# Patient Record
Sex: FEMALE | Race: WHITE | URBAN - METROPOLITAN AREA
[De-identification: names, ages, dates, MRNs, and addresses within clinical notes are randomized per-mention and may not be internally consistent; named-entity substitution may affect disease eponyms.]

---

## 2017-01-01 ENCOUNTER — TRANSFERRED RECORDS (OUTPATIENT)
Dept: HEALTH INFORMATION MANAGEMENT | Facility: CLINIC | Age: 67
End: 2017-01-01

## 2018-01-01 ENCOUNTER — TRANSFERRED RECORDS (OUTPATIENT)
Dept: HEALTH INFORMATION MANAGEMENT | Facility: CLINIC | Age: 68
End: 2018-01-01

## 2018-01-01 ENCOUNTER — APPOINTMENT (OUTPATIENT)
Dept: CT IMAGING | Facility: CLINIC | Age: 68
DRG: 193 | End: 2018-01-01
Attending: HOSPITALIST
Payer: MEDICARE

## 2018-01-01 ENCOUNTER — HOSPITAL ENCOUNTER (EMERGENCY)
Facility: CLINIC | Age: 68
Discharge: ANOTHER HOSPITAL | End: 2018-04-01
Attending: EMERGENCY MEDICINE | Admitting: EMERGENCY MEDICINE
Payer: MEDICARE

## 2018-01-01 ENCOUNTER — HOSPITAL ENCOUNTER (INPATIENT)
Facility: CLINIC | Age: 68
LOS: 4 days | Discharge: ANOTHER HOSPITAL | DRG: 193 | End: 2018-03-06
Attending: EMERGENCY MEDICINE | Admitting: INTERNAL MEDICINE
Payer: MEDICARE

## 2018-01-01 ENCOUNTER — APPOINTMENT (OUTPATIENT)
Dept: CT IMAGING | Facility: CLINIC | Age: 68
DRG: 193 | End: 2018-01-01
Attending: EMERGENCY MEDICINE
Payer: MEDICARE

## 2018-01-01 ENCOUNTER — APPOINTMENT (OUTPATIENT)
Dept: GENERAL RADIOLOGY | Facility: CLINIC | Age: 68
End: 2018-01-01
Attending: EMERGENCY MEDICINE
Payer: MEDICARE

## 2018-01-01 VITALS
OXYGEN SATURATION: 98 % | BODY MASS INDEX: 20.98 KG/M2 | DIASTOLIC BLOOD PRESSURE: 82 MMHG | RESPIRATION RATE: 19 BRPM | SYSTOLIC BLOOD PRESSURE: 132 MMHG | TEMPERATURE: 97.3 F | WEIGHT: 130 LBS

## 2018-01-01 VITALS
DIASTOLIC BLOOD PRESSURE: 60 MMHG | HEIGHT: 66 IN | RESPIRATION RATE: 20 BRPM | TEMPERATURE: 97.4 F | BODY MASS INDEX: 23.17 KG/M2 | SYSTOLIC BLOOD PRESSURE: 117 MMHG | OXYGEN SATURATION: 94 % | HEART RATE: 100 BPM | WEIGHT: 144.18 LBS

## 2018-01-01 DIAGNOSIS — J96.01 ACUTE RESPIRATORY FAILURE WITH HYPOXIA AND HYPERCARBIA (H): ICD-10-CM

## 2018-01-01 DIAGNOSIS — J96.02 ACUTE RESPIRATORY FAILURE WITH HYPOXIA AND HYPERCARBIA (H): ICD-10-CM

## 2018-01-01 DIAGNOSIS — C34.91 MALIGNANT NEOPLASM OF RIGHT LUNG, UNSPECIFIED PART OF LUNG (H): ICD-10-CM

## 2018-01-01 DIAGNOSIS — J18.9 PNEUMONIA OF BOTH LOWER LOBES DUE TO INFECTIOUS ORGANISM: Primary | ICD-10-CM

## 2018-01-01 LAB
ALBUMIN UR-MCNC: 30 MG/DL
ANION GAP SERPL CALCULATED.3IONS-SCNC: 4 MMOL/L (ref 3–14)
ANION GAP SERPL CALCULATED.3IONS-SCNC: 6 MMOL/L (ref 3–14)
ANION GAP SERPL CALCULATED.3IONS-SCNC: 6 MMOL/L (ref 3–14)
ANION GAP SERPL CALCULATED.3IONS-SCNC: 7 MMOL/L (ref 3–14)
ANION GAP SERPL CALCULATED.3IONS-SCNC: 7 MMOL/L (ref 3–14)
ANION GAP SERPL CALCULATED.3IONS-SCNC: 8 MMOL/L (ref 3–14)
APPEARANCE UR: ABNORMAL
BACTERIA SPEC CULT: NO GROWTH
BACTERIA SPEC CULT: NO GROWTH
BACTERIA SPEC CULT: NORMAL
BASOPHILS # BLD AUTO: 0 10E9/L (ref 0–0.2)
BASOPHILS # BLD AUTO: 0.1 10E9/L (ref 0–0.2)
BASOPHILS NFR BLD AUTO: 0.2 %
BASOPHILS NFR BLD AUTO: 0.3 %
BILIRUB UR QL STRIP: NEGATIVE
BUN SERPL-MCNC: 10 MG/DL (ref 7–30)
BUN SERPL-MCNC: 15 MG/DL (ref 7–30)
BUN SERPL-MCNC: 18 MG/DL (ref 7–30)
BUN SERPL-MCNC: 22 MG/DL (ref 7–30)
BUN SERPL-MCNC: 25 MG/DL (ref 7–30)
BUN SERPL-MCNC: 32 MG/DL (ref 7–30)
C DIFF TOX B STL QL: NEGATIVE
CALCIUM SERPL-MCNC: 8.2 MG/DL (ref 8.5–10.1)
CALCIUM SERPL-MCNC: 8.5 MG/DL (ref 8.5–10.1)
CALCIUM SERPL-MCNC: 8.6 MG/DL (ref 8.5–10.1)
CALCIUM SERPL-MCNC: 8.7 MG/DL (ref 8.5–10.1)
CALCIUM SERPL-MCNC: 8.8 MG/DL (ref 8.5–10.1)
CALCIUM SERPL-MCNC: 8.8 MG/DL (ref 8.5–10.1)
CHLORIDE SERPL-SCNC: 100 MMOL/L (ref 94–109)
CHLORIDE SERPL-SCNC: 100 MMOL/L (ref 94–109)
CHLORIDE SERPL-SCNC: 86 MMOL/L (ref 94–109)
CHLORIDE SERPL-SCNC: 88 MMOL/L (ref 94–109)
CHLORIDE SERPL-SCNC: 91 MMOL/L (ref 94–109)
CHLORIDE SERPL-SCNC: 98 MMOL/L (ref 94–109)
CO2 BLDCOV-SCNC: 37 MMOL/L (ref 21–28)
CO2 BLDCOV-SCNC: 39 MMOL/L (ref 21–28)
CO2 SERPL-SCNC: 32 MMOL/L (ref 20–32)
CO2 SERPL-SCNC: 32 MMOL/L (ref 20–32)
CO2 SERPL-SCNC: 33 MMOL/L (ref 20–32)
CO2 SERPL-SCNC: 33 MMOL/L (ref 20–32)
CO2 SERPL-SCNC: 34 MMOL/L (ref 20–32)
CO2 SERPL-SCNC: 35 MMOL/L (ref 20–32)
COLOR UR AUTO: ABNORMAL
CREAT SERPL-MCNC: 0.51 MG/DL (ref 0.52–1.04)
CREAT SERPL-MCNC: 0.51 MG/DL (ref 0.52–1.04)
CREAT SERPL-MCNC: 0.58 MG/DL (ref 0.57–1.11)
CREAT SERPL-MCNC: 0.62 MG/DL (ref 0.52–1.04)
CREAT SERPL-MCNC: 1.04 MG/DL (ref 0.52–1.04)
CREAT SERPL-MCNC: 1.2 MG/DL (ref 0.52–1.04)
CREAT SERPL-MCNC: 1.28 MG/DL (ref 0.52–1.04)
D DIMER PPP FEU-MCNC: 3.2 UG/ML FEU (ref 0–0.5)
DIFFERENTIAL METHOD BLD: ABNORMAL
DIFFERENTIAL METHOD BLD: ABNORMAL
EOSINOPHIL # BLD AUTO: 0 10E9/L (ref 0–0.7)
EOSINOPHIL # BLD AUTO: 0.2 10E9/L (ref 0–0.7)
EOSINOPHIL NFR BLD AUTO: 0.1 %
EOSINOPHIL NFR BLD AUTO: 1.2 %
ERYTHROCYTE [DISTWIDTH] IN BLOOD BY AUTOMATED COUNT: 17 % (ref 10–15)
ERYTHROCYTE [DISTWIDTH] IN BLOOD BY AUTOMATED COUNT: 17.1 % (ref 10–15)
ERYTHROCYTE [DISTWIDTH] IN BLOOD BY AUTOMATED COUNT: 17.5 % (ref 10–15)
ERYTHROCYTE [DISTWIDTH] IN BLOOD BY AUTOMATED COUNT: 17.9 % (ref 10–15)
ERYTHROCYTE [DISTWIDTH] IN BLOOD BY AUTOMATED COUNT: 18.5 % (ref 10–15)
ERYTHROCYTE [DISTWIDTH] IN BLOOD BY AUTOMATED COUNT: 18.6 % (ref 10–15)
FLUAV+FLUBV AG SPEC QL: NEGATIVE
FLUAV+FLUBV AG SPEC QL: NEGATIVE
GFR SERPL CREATININE-BSD FRML MDRD: 42 ML/MIN/1.7M2
GFR SERPL CREATININE-BSD FRML MDRD: 45 ML/MIN/1.7M2
GFR SERPL CREATININE-BSD FRML MDRD: 53 ML/MIN/1.7M2
GFR SERPL CREATININE-BSD FRML MDRD: >60 ML/MIN/1.73M2
GFR SERPL CREATININE-BSD FRML MDRD: >90 ML/MIN/1.7M2
GLUCOSE BLDC GLUCOMTR-MCNC: 101 MG/DL (ref 70–99)
GLUCOSE BLDC GLUCOMTR-MCNC: 103 MG/DL (ref 70–99)
GLUCOSE BLDC GLUCOMTR-MCNC: 117 MG/DL (ref 70–99)
GLUCOSE BLDC GLUCOMTR-MCNC: 122 MG/DL (ref 70–99)
GLUCOSE BLDC GLUCOMTR-MCNC: 137 MG/DL (ref 70–99)
GLUCOSE BLDC GLUCOMTR-MCNC: 138 MG/DL (ref 70–99)
GLUCOSE BLDC GLUCOMTR-MCNC: 143 MG/DL (ref 70–99)
GLUCOSE BLDC GLUCOMTR-MCNC: 145 MG/DL (ref 70–99)
GLUCOSE BLDC GLUCOMTR-MCNC: 149 MG/DL (ref 70–99)
GLUCOSE BLDC GLUCOMTR-MCNC: 165 MG/DL (ref 70–99)
GLUCOSE BLDC GLUCOMTR-MCNC: 178 MG/DL (ref 70–99)
GLUCOSE BLDC GLUCOMTR-MCNC: 197 MG/DL (ref 70–99)
GLUCOSE BLDC GLUCOMTR-MCNC: 210 MG/DL (ref 70–99)
GLUCOSE BLDC GLUCOMTR-MCNC: 216 MG/DL (ref 70–99)
GLUCOSE BLDC GLUCOMTR-MCNC: 238 MG/DL (ref 70–99)
GLUCOSE BLDC GLUCOMTR-MCNC: 99 MG/DL (ref 70–99)
GLUCOSE SERPL-MCNC: 105 MG/DL (ref 70–99)
GLUCOSE SERPL-MCNC: 111 MG/DL (ref 70–99)
GLUCOSE SERPL-MCNC: 136 MG/DL (ref 70–99)
GLUCOSE SERPL-MCNC: 143 MG/DL (ref 70–99)
GLUCOSE SERPL-MCNC: 188 MG/DL (ref 70–99)
GLUCOSE SERPL-MCNC: 82 MG/DL (ref 65–100)
GLUCOSE SERPL-MCNC: 98 MG/DL (ref 70–99)
GLUCOSE UR STRIP-MCNC: NEGATIVE MG/DL
GRAM STN SPEC: NORMAL
HBA1C MFR BLD: 6.3 % (ref 4.3–6)
HCT VFR BLD AUTO: 23.2 % (ref 35–47)
HCT VFR BLD AUTO: 23.5 % (ref 35–47)
HCT VFR BLD AUTO: 25.6 % (ref 35–47)
HCT VFR BLD AUTO: 25.9 % (ref 35–47)
HCT VFR BLD AUTO: 30.6 % (ref 35–47)
HCT VFR BLD AUTO: 30.7 % (ref 35–47)
HGB BLD-MCNC: 7.3 G/DL (ref 11.7–15.7)
HGB BLD-MCNC: 7.4 G/DL (ref 11.7–15.7)
HGB BLD-MCNC: 7.8 G/DL (ref 11.7–15.7)
HGB BLD-MCNC: 8.1 G/DL (ref 11.7–15.7)
HGB BLD-MCNC: 8.9 G/DL (ref 11.7–15.7)
HGB BLD-MCNC: 9.4 G/DL (ref 11.7–15.7)
HGB UR QL STRIP: ABNORMAL
IMM GRANULOCYTES # BLD: 0.1 10E9/L (ref 0–0.4)
IMM GRANULOCYTES # BLD: 0.2 10E9/L (ref 0–0.4)
IMM GRANULOCYTES NFR BLD: 0.9 %
IMM GRANULOCYTES NFR BLD: 1.3 %
INTERPRETATION ECG - MUSE: NORMAL
KETONES UR STRIP-MCNC: NEGATIVE MG/DL
LACTATE BLD-SCNC: 1.4 MMOL/L (ref 0.7–2.1)
LACTATE BLD-SCNC: 1.5 MMOL/L (ref 0.7–2.1)
LACTATE BLD-SCNC: 1.8 MMOL/L (ref 0.7–2)
LACTATE BLD-SCNC: 2.3 MMOL/L (ref 0.7–2)
LEUKOCYTE ESTERASE UR QL STRIP: ABNORMAL
LYMPHOCYTES # BLD AUTO: 0.4 10E9/L (ref 0.8–5.3)
LYMPHOCYTES # BLD AUTO: 0.9 10E9/L (ref 0.8–5.3)
LYMPHOCYTES NFR BLD AUTO: 2.5 %
LYMPHOCYTES NFR BLD AUTO: 6 %
Lab: NORMAL
MAGNESIUM SERPL-MCNC: 1.3 MG/DL (ref 1.6–2.3)
MAGNESIUM SERPL-MCNC: 2 MG/DL (ref 1.6–2.3)
MAGNESIUM SERPL-MCNC: 2.4 MG/DL (ref 1.6–2.3)
MCH RBC QN AUTO: 24.5 PG (ref 26.5–33)
MCH RBC QN AUTO: 25.3 PG (ref 26.5–33)
MCH RBC QN AUTO: 25.4 PG (ref 26.5–33)
MCH RBC QN AUTO: 25.7 PG (ref 26.5–33)
MCH RBC QN AUTO: 25.8 PG (ref 26.5–33)
MCH RBC QN AUTO: 26.6 PG (ref 26.5–33)
MCHC RBC AUTO-ENTMCNC: 29 G/DL (ref 31.5–36.5)
MCHC RBC AUTO-ENTMCNC: 30.1 G/DL (ref 31.5–36.5)
MCHC RBC AUTO-ENTMCNC: 30.7 G/DL (ref 31.5–36.5)
MCHC RBC AUTO-ENTMCNC: 31.5 G/DL (ref 31.5–36.5)
MCHC RBC AUTO-ENTMCNC: 31.5 G/DL (ref 31.5–36.5)
MCHC RBC AUTO-ENTMCNC: 31.6 G/DL (ref 31.5–36.5)
MCV RBC AUTO: 81 FL (ref 78–100)
MCV RBC AUTO: 81 FL (ref 78–100)
MCV RBC AUTO: 82 FL (ref 78–100)
MCV RBC AUTO: 84 FL (ref 78–100)
MCV RBC AUTO: 85 FL (ref 78–100)
MCV RBC AUTO: 87 FL (ref 78–100)
MONOCYTES # BLD AUTO: 0.6 10E9/L (ref 0–1.3)
MONOCYTES # BLD AUTO: 0.8 10E9/L (ref 0–1.3)
MONOCYTES NFR BLD AUTO: 3.9 %
MONOCYTES NFR BLD AUTO: 5.6 %
MRSA DNA SPEC QL NAA+PROBE: NEGATIVE
MUCOUS THREADS #/AREA URNS LPF: PRESENT /LPF
NEUTROPHILS # BLD AUTO: 12.9 10E9/L (ref 1.6–8.3)
NEUTROPHILS # BLD AUTO: 14.8 10E9/L (ref 1.6–8.3)
NEUTROPHILS NFR BLD AUTO: 86.8 %
NEUTROPHILS NFR BLD AUTO: 91.2 %
NITRATE UR QL: NEGATIVE
NRBC # BLD AUTO: 0 10*3/UL
NRBC # BLD AUTO: 0 10*3/UL
NRBC BLD AUTO-RTO: 0 /100
NRBC BLD AUTO-RTO: 0 /100
NT-PROBNP SERPL-MCNC: 5367 PG/ML (ref 0–900)
PCO2 BLDV: 66 MM HG (ref 40–50)
PCO2 BLDV: 90 MM HG (ref 40–50)
PH BLDV: 7.24 PH (ref 7.32–7.43)
PH BLDV: 7.35 PH (ref 7.32–7.43)
PH UR STRIP: 6.5 PH (ref 5–7)
PHOSPHATE SERPL-MCNC: 3.5 MG/DL (ref 2.5–4.5)
PHOSPHATE SERPL-MCNC: 3.6 MG/DL (ref 2.5–4.5)
PLATELET # BLD AUTO: 446 10E9/L (ref 150–450)
PLATELET # BLD AUTO: 480 10E9/L (ref 150–450)
PLATELET # BLD AUTO: 495 10E9/L (ref 150–450)
PLATELET # BLD AUTO: 511 10E9/L (ref 150–450)
PLATELET # BLD AUTO: 829 10E9/L (ref 150–450)
PLATELET # BLD AUTO: 928 10E9/L (ref 150–450)
PO2 BLDV: 27 MM HG (ref 25–47)
PO2 BLDV: 42 MM HG (ref 25–47)
POTASSIUM SERPL-SCNC: 3.1 MMOL/L (ref 3.4–5.3)
POTASSIUM SERPL-SCNC: 3.3 MMOL/L (ref 3.4–5.3)
POTASSIUM SERPL-SCNC: 3.4 MMOL/L (ref 3.4–5.3)
POTASSIUM SERPL-SCNC: 3.6 MMOL/L (ref 3.5–5)
POTASSIUM SERPL-SCNC: 3.7 MMOL/L (ref 3.4–5.3)
POTASSIUM SERPL-SCNC: 3.8 MMOL/L (ref 3.4–5.3)
POTASSIUM SERPL-SCNC: 4.2 MMOL/L (ref 3.4–5.3)
POTASSIUM SERPL-SCNC: 4.3 MMOL/L (ref 3.4–5.3)
POTASSIUM SERPL-SCNC: 4.4 MMOL/L (ref 3.4–5.3)
POTASSIUM SERPL-SCNC: 6 MMOL/L (ref 3.4–5.3)
PREALB SERPL IA-MCNC: 5 MG/DL (ref 15–45)
PROCALCITONIN SERPL-MCNC: 0.56 NG/ML
PROCALCITONIN SERPL-MCNC: 0.74 NG/ML
PROCALCITONIN SERPL-MCNC: 0.82 NG/ML
RBC # BLD AUTO: 2.83 10E12/L (ref 3.8–5.2)
RBC # BLD AUTO: 2.91 10E12/L (ref 3.8–5.2)
RBC # BLD AUTO: 3.08 10E12/L (ref 3.8–5.2)
RBC # BLD AUTO: 3.15 10E12/L (ref 3.8–5.2)
RBC # BLD AUTO: 3.53 10E12/L (ref 3.8–5.2)
RBC # BLD AUTO: 3.63 10E12/L (ref 3.8–5.2)
RBC #/AREA URNS AUTO: 6 /HPF (ref 0–2)
SAO2 % BLDV FROM PO2: 45 %
SAO2 % BLDV FROM PO2: 66 %
SODIUM SERPL-SCNC: 127 MMOL/L (ref 133–144)
SODIUM SERPL-SCNC: 128 MMOL/L (ref 133–144)
SODIUM SERPL-SCNC: 128 MMOL/L (ref 133–144)
SODIUM SERPL-SCNC: 137 MMOL/L (ref 133–144)
SODIUM SERPL-SCNC: 140 MMOL/L (ref 133–144)
SODIUM SERPL-SCNC: 140 MMOL/L (ref 133–144)
SODIUM UR-SCNC: 51 MMOL/L
SOURCE: ABNORMAL
SP GR UR STRIP: 1.01 (ref 1–1.03)
SPECIMEN SOURCE: NORMAL
SQUAMOUS #/AREA URNS AUTO: 1 /HPF (ref 0–1)
TROPONIN I SERPL-MCNC: 0.02 UG/L (ref 0–0.04)
UROBILINOGEN UR STRIP-MCNC: NORMAL MG/DL (ref 0–2)
WBC # BLD AUTO: 14.9 10E9/L (ref 4–11)
WBC # BLD AUTO: 16.2 10E9/L (ref 4–11)
WBC # BLD AUTO: 16.6 10E9/L (ref 4–11)
WBC # BLD AUTO: 20.3 10E9/L (ref 4–11)
WBC # BLD AUTO: 26.7 10E9/L (ref 4–11)
WBC # BLD AUTO: 30.5 10E9/L (ref 4–11)
WBC #/AREA URNS AUTO: 7 /HPF (ref 0–5)

## 2018-01-01 PROCEDURE — 84300 ASSAY OF URINE SODIUM: CPT | Performed by: HOSPITALIST

## 2018-01-01 PROCEDURE — 25000132 ZZH RX MED GY IP 250 OP 250 PS 637: Mod: GY | Performed by: EMERGENCY MEDICINE

## 2018-01-01 PROCEDURE — 36415 COLL VENOUS BLD VENIPUNCTURE: CPT | Performed by: HOSPITALIST

## 2018-01-01 PROCEDURE — 99233 SBSQ HOSP IP/OBS HIGH 50: CPT | Performed by: HOSPITALIST

## 2018-01-01 PROCEDURE — 83036 HEMOGLOBIN GLYCOSYLATED A1C: CPT | Performed by: INTERNAL MEDICINE

## 2018-01-01 PROCEDURE — 25000128 H RX IP 250 OP 636: Performed by: HOSPITALIST

## 2018-01-01 PROCEDURE — 84132 ASSAY OF SERUM POTASSIUM: CPT | Performed by: INTERNAL MEDICINE

## 2018-01-01 PROCEDURE — A9270 NON-COVERED ITEM OR SERVICE: HCPCS | Mod: GY | Performed by: INTERNAL MEDICINE

## 2018-01-01 PROCEDURE — 87070 CULTURE OTHR SPECIMN AEROBIC: CPT | Performed by: INTERNAL MEDICINE

## 2018-01-01 PROCEDURE — 96374 THER/PROPH/DIAG INJ IV PUSH: CPT

## 2018-01-01 PROCEDURE — 85027 COMPLETE CBC AUTOMATED: CPT | Performed by: HOSPITALIST

## 2018-01-01 PROCEDURE — 83605 ASSAY OF LACTIC ACID: CPT | Performed by: HOSPITALIST

## 2018-01-01 PROCEDURE — 40000275 ZZH STATISTIC RCP TIME EA 10 MIN

## 2018-01-01 PROCEDURE — 00000146 ZZHCL STATISTIC GLUCOSE BY METER IP

## 2018-01-01 PROCEDURE — 12000000 ZZH R&B MED SURG/OB

## 2018-01-01 PROCEDURE — 80048 BASIC METABOLIC PNL TOTAL CA: CPT | Performed by: INTERNAL MEDICINE

## 2018-01-01 PROCEDURE — 36415 COLL VENOUS BLD VENIPUNCTURE: CPT | Performed by: INTERNAL MEDICINE

## 2018-01-01 PROCEDURE — 87640 STAPH A DNA AMP PROBE: CPT | Performed by: INTERNAL MEDICINE

## 2018-01-01 PROCEDURE — 94640 AIRWAY INHALATION TREATMENT: CPT | Mod: 76

## 2018-01-01 PROCEDURE — 25000128 H RX IP 250 OP 636: Performed by: EMERGENCY MEDICINE

## 2018-01-01 PROCEDURE — 40000894 ZZH STATISTIC OT IP EVAL DEFER

## 2018-01-01 PROCEDURE — 25000132 ZZH RX MED GY IP 250 OP 250 PS 637: Mod: GY | Performed by: INTERNAL MEDICINE

## 2018-01-01 PROCEDURE — 81001 URINALYSIS AUTO W/SCOPE: CPT | Performed by: HOSPITALIST

## 2018-01-01 PROCEDURE — 71250 CT THORAX DX C-: CPT

## 2018-01-01 PROCEDURE — 84145 PROCALCITONIN (PCT): CPT | Performed by: INTERNAL MEDICINE

## 2018-01-01 PROCEDURE — 82803 BLOOD GASES ANY COMBINATION: CPT

## 2018-01-01 PROCEDURE — 25000125 ZZHC RX 250: Performed by: HOSPITALIST

## 2018-01-01 PROCEDURE — 87493 C DIFF AMPLIFIED PROBE: CPT | Performed by: HOSPITALIST

## 2018-01-01 PROCEDURE — 85027 COMPLETE CBC AUTOMATED: CPT | Performed by: INTERNAL MEDICINE

## 2018-01-01 PROCEDURE — 20000003 ZZH R&B ICU

## 2018-01-01 PROCEDURE — 83735 ASSAY OF MAGNESIUM: CPT | Performed by: INTERNAL MEDICINE

## 2018-01-01 PROCEDURE — 25000125 ZZHC RX 250: Performed by: INTERNAL MEDICINE

## 2018-01-01 PROCEDURE — 87641 MR-STAPH DNA AMP PROBE: CPT | Performed by: INTERNAL MEDICINE

## 2018-01-01 PROCEDURE — 84100 ASSAY OF PHOSPHORUS: CPT | Performed by: INTERNAL MEDICINE

## 2018-01-01 PROCEDURE — A9270 NON-COVERED ITEM OR SERVICE: HCPCS | Mod: GY | Performed by: HOSPITALIST

## 2018-01-01 PROCEDURE — 99285 EMERGENCY DEPT VISIT HI MDM: CPT | Mod: 25

## 2018-01-01 PROCEDURE — 99223 1ST HOSP IP/OBS HIGH 75: CPT | Performed by: INTERNAL MEDICINE

## 2018-01-01 PROCEDURE — 99223 1ST HOSP IP/OBS HIGH 75: CPT | Mod: AI | Performed by: INTERNAL MEDICINE

## 2018-01-01 PROCEDURE — 25000132 ZZH RX MED GY IP 250 OP 250 PS 637: Mod: GY | Performed by: HOSPITALIST

## 2018-01-01 PROCEDURE — 83735 ASSAY OF MAGNESIUM: CPT | Performed by: HOSPITALIST

## 2018-01-01 PROCEDURE — 25000125 ZZHC RX 250: Performed by: EMERGENCY MEDICINE

## 2018-01-01 PROCEDURE — 99222 1ST HOSP IP/OBS MODERATE 55: CPT | Performed by: INTERNAL MEDICINE

## 2018-01-01 PROCEDURE — 84145 PROCALCITONIN (PCT): CPT | Performed by: EMERGENCY MEDICINE

## 2018-01-01 PROCEDURE — 87040 BLOOD CULTURE FOR BACTERIA: CPT | Performed by: EMERGENCY MEDICINE

## 2018-01-01 PROCEDURE — 99207 ZZC APP CREDIT; MD BILLING SHARED VISIT: CPT | Performed by: INTERNAL MEDICINE

## 2018-01-01 PROCEDURE — 94640 AIRWAY INHALATION TREATMENT: CPT

## 2018-01-01 PROCEDURE — 96375 TX/PRO/DX INJ NEW DRUG ADDON: CPT

## 2018-01-01 PROCEDURE — 25000131 ZZH RX MED GY IP 250 OP 636 PS 637: Mod: GY | Performed by: INTERNAL MEDICINE

## 2018-01-01 PROCEDURE — 84100 ASSAY OF PHOSPHORUS: CPT | Performed by: HOSPITALIST

## 2018-01-01 PROCEDURE — 84132 ASSAY OF SERUM POTASSIUM: CPT | Performed by: HOSPITALIST

## 2018-01-01 PROCEDURE — 71260 CT THORAX DX C+: CPT

## 2018-01-01 PROCEDURE — 83605 ASSAY OF LACTIC ACID: CPT

## 2018-01-01 PROCEDURE — 85025 COMPLETE CBC W/AUTO DIFF WBC: CPT | Performed by: EMERGENCY MEDICINE

## 2018-01-01 PROCEDURE — 87804 INFLUENZA ASSAY W/OPTIC: CPT | Performed by: EMERGENCY MEDICINE

## 2018-01-01 PROCEDURE — 25000128 H RX IP 250 OP 636: Performed by: INTERNAL MEDICINE

## 2018-01-01 PROCEDURE — 80048 BASIC METABOLIC PNL TOTAL CA: CPT | Performed by: EMERGENCY MEDICINE

## 2018-01-01 PROCEDURE — 99239 HOSP IP/OBS DSCHRG MGMT >30: CPT | Performed by: HOSPITALIST

## 2018-01-01 PROCEDURE — 83880 ASSAY OF NATRIURETIC PEPTIDE: CPT | Performed by: HOSPITALIST

## 2018-01-01 PROCEDURE — 71045 X-RAY EXAM CHEST 1 VIEW: CPT

## 2018-01-01 PROCEDURE — 84145 PROCALCITONIN (PCT): CPT | Performed by: HOSPITALIST

## 2018-01-01 PROCEDURE — 80048 BASIC METABOLIC PNL TOTAL CA: CPT | Performed by: HOSPITALIST

## 2018-01-01 PROCEDURE — 36415 COLL VENOUS BLD VENIPUNCTURE: CPT

## 2018-01-01 PROCEDURE — 84134 ASSAY OF PREALBUMIN: CPT | Performed by: HOSPITALIST

## 2018-01-01 PROCEDURE — 87205 SMEAR GRAM STAIN: CPT | Performed by: INTERNAL MEDICINE

## 2018-01-01 PROCEDURE — 84484 ASSAY OF TROPONIN QUANT: CPT | Performed by: EMERGENCY MEDICINE

## 2018-01-01 PROCEDURE — 93005 ELECTROCARDIOGRAM TRACING: CPT

## 2018-01-01 PROCEDURE — 85379 FIBRIN DEGRADATION QUANT: CPT | Performed by: EMERGENCY MEDICINE

## 2018-01-01 PROCEDURE — 83880 ASSAY OF NATRIURETIC PEPTIDE: CPT | Performed by: INTERNAL MEDICINE

## 2018-01-01 PROCEDURE — 94660 CPAP INITIATION&MGMT: CPT

## 2018-01-01 RX ORDER — IPRATROPIUM BROMIDE AND ALBUTEROL SULFATE 2.5; .5 MG/3ML; MG/3ML
3 SOLUTION RESPIRATORY (INHALATION) ONCE
Status: DISCONTINUED | OUTPATIENT
Start: 2018-01-01 | End: 2018-01-01 | Stop reason: HOSPADM

## 2018-01-01 RX ORDER — IPRATROPIUM BROMIDE AND ALBUTEROL SULFATE 2.5; .5 MG/3ML; MG/3ML
3 SOLUTION RESPIRATORY (INHALATION) ONCE
Status: COMPLETED | OUTPATIENT
Start: 2018-01-01 | End: 2018-01-01

## 2018-01-01 RX ORDER — ALBUTEROL SULFATE 0.83 MG/ML
2.5 SOLUTION RESPIRATORY (INHALATION)
Status: DISCONTINUED | OUTPATIENT
Start: 2018-01-01 | End: 2018-01-01 | Stop reason: HOSPADM

## 2018-01-01 RX ORDER — PIPERACILLIN SODIUM, TAZOBACTAM SODIUM 4; .5 G/20ML; G/20ML
4.5 INJECTION, POWDER, LYOPHILIZED, FOR SOLUTION INTRAVENOUS ONCE
Status: COMPLETED | OUTPATIENT
Start: 2018-01-01 | End: 2018-01-01

## 2018-01-01 RX ORDER — SODIUM CHLORIDE FOR INHALATION 3 %
3 VIAL, NEBULIZER (ML) INHALATION
Status: DISCONTINUED | OUTPATIENT
Start: 2018-01-01 | End: 2018-01-01 | Stop reason: HOSPADM

## 2018-01-01 RX ORDER — MEROPENEM 1 G/1
1 INJECTION, POWDER, FOR SOLUTION INTRAVENOUS EVERY 12 HOURS
Qty: 30 EACH | DISCHARGE
Start: 2018-01-01

## 2018-01-01 RX ORDER — PANTOPRAZOLE SODIUM 40 MG/1
40 TABLET, DELAYED RELEASE ORAL ONCE
Status: COMPLETED | OUTPATIENT
Start: 2018-01-01 | End: 2018-01-01

## 2018-01-01 RX ORDER — DEXTROMETHORPHAN POLISTIREX 30 MG/5ML
30 SUSPENSION ORAL EVERY 12 HOURS SCHEDULED
Status: DISCONTINUED | OUTPATIENT
Start: 2018-01-01 | End: 2018-01-01

## 2018-01-01 RX ORDER — AMOXICILLIN 250 MG
1 CAPSULE ORAL DAILY PRN
COMMUNITY

## 2018-01-01 RX ORDER — SODIUM CHLORIDE FOR INHALATION 3 %
3 VIAL, NEBULIZER (ML) INHALATION
Status: DISCONTINUED | OUTPATIENT
Start: 2018-01-01 | End: 2018-01-01

## 2018-01-01 RX ORDER — SODIUM CHLORIDE FOR INHALATION 0.9 %
3 VIAL, NEBULIZER (ML) INHALATION 4 TIMES DAILY
COMMUNITY

## 2018-01-01 RX ORDER — POTASSIUM CHLORIDE 1.5 G/1.58G
20-40 POWDER, FOR SOLUTION ORAL
Status: DISCONTINUED | OUTPATIENT
Start: 2018-01-01 | End: 2018-01-01 | Stop reason: HOSPADM

## 2018-01-01 RX ORDER — ALUMINA, MAGNESIA, AND SIMETHICONE 2400; 2400; 240 MG/30ML; MG/30ML; MG/30ML
15 SUSPENSION ORAL EVERY 4 HOURS PRN
Status: DISCONTINUED | OUTPATIENT
Start: 2018-01-01 | End: 2018-01-01 | Stop reason: HOSPADM

## 2018-01-01 RX ORDER — METOPROLOL TARTRATE 25 MG/1
25 TABLET, FILM COATED ORAL EVERY 8 HOURS
Status: DISCONTINUED | OUTPATIENT
Start: 2018-01-01 | End: 2018-01-01 | Stop reason: HOSPADM

## 2018-01-01 RX ORDER — OXYCODONE HCL 5 MG/5 ML
5 SOLUTION, ORAL ORAL
COMMUNITY

## 2018-01-01 RX ORDER — MAGNESIUM HYDROXIDE/ALUMINUM HYDROXICE/SIMETHICONE 120; 1200; 1200 MG/30ML; MG/30ML; MG/30ML
30 SUSPENSION ORAL EVERY 4 HOURS PRN
COMMUNITY

## 2018-01-01 RX ORDER — PAROXETINE 20 MG/1
20 TABLET, FILM COATED ORAL DAILY
Status: DISCONTINUED | OUTPATIENT
Start: 2018-01-01 | End: 2018-01-01 | Stop reason: HOSPADM

## 2018-01-01 RX ORDER — METOPROLOL TARTRATE 1 MG/ML
5 INJECTION, SOLUTION INTRAVENOUS ONCE
Status: COMPLETED | OUTPATIENT
Start: 2018-01-01 | End: 2018-01-01

## 2018-01-01 RX ORDER — POTASSIUM CHLORIDE 7.45 MG/ML
10 INJECTION INTRAVENOUS
Status: DISCONTINUED | OUTPATIENT
Start: 2018-01-01 | End: 2018-01-01 | Stop reason: HOSPADM

## 2018-01-01 RX ORDER — ALBUTEROL SULFATE 90 UG/1
1-2 AEROSOL, METERED RESPIRATORY (INHALATION) EVERY 4 HOURS PRN
COMMUNITY

## 2018-01-01 RX ORDER — DEXTROSE MONOHYDRATE 25 G/50ML
25-50 INJECTION, SOLUTION INTRAVENOUS
Status: DISCONTINUED | OUTPATIENT
Start: 2018-01-01 | End: 2018-01-01 | Stop reason: HOSPADM

## 2018-01-01 RX ORDER — DIPHENHYDRAMINE HYDROCHLORIDE AND LIDOCAINE HYDROCHLORIDE AND ALUMINUM HYDROXIDE AND MAGNESIUM HYDRO
15 KIT 4 TIMES DAILY
Status: DISCONTINUED | OUTPATIENT
Start: 2018-01-01 | End: 2018-01-01

## 2018-01-01 RX ORDER — DILTIAZEM HYDROCHLORIDE 30 MG/1
90 TABLET, FILM COATED ORAL EVERY 6 HOURS
Status: DISCONTINUED | OUTPATIENT
Start: 2018-01-01 | End: 2018-01-01 | Stop reason: HOSPADM

## 2018-01-01 RX ORDER — MAGNESIUM SULFATE HEPTAHYDRATE 40 MG/ML
4 INJECTION, SOLUTION INTRAVENOUS EVERY 4 HOURS PRN
Status: DISCONTINUED | OUTPATIENT
Start: 2018-01-01 | End: 2018-01-01 | Stop reason: HOSPADM

## 2018-01-01 RX ORDER — METOPROLOL TARTRATE 1 MG/ML
2.5-5 INJECTION, SOLUTION INTRAVENOUS EVERY 4 HOURS PRN
Status: DISCONTINUED | OUTPATIENT
Start: 2018-01-01 | End: 2018-01-01 | Stop reason: HOSPADM

## 2018-01-01 RX ORDER — PROCHLORPERAZINE MALEATE 5 MG
5 TABLET ORAL EVERY 6 HOURS PRN
Status: DISCONTINUED | OUTPATIENT
Start: 2018-01-01 | End: 2018-01-01 | Stop reason: HOSPADM

## 2018-01-01 RX ORDER — FLUORIDE TOOTHPASTE
15 TOOTHPASTE DENTAL 4 TIMES DAILY PRN
Status: DISCONTINUED | OUTPATIENT
Start: 2018-01-01 | End: 2018-01-01 | Stop reason: HOSPADM

## 2018-01-01 RX ORDER — ESMOLOL HYDROCHLORIDE 20 MG/ML
50-300 INJECTION, SOLUTION INTRAVENOUS CONTINUOUS
Status: DISCONTINUED | OUTPATIENT
Start: 2018-01-01 | End: 2018-01-01

## 2018-01-01 RX ORDER — FUROSEMIDE 10 MG/ML
40 INJECTION INTRAMUSCULAR; INTRAVENOUS ONCE
Status: COMPLETED | OUTPATIENT
Start: 2018-01-01 | End: 2018-01-01

## 2018-01-01 RX ORDER — POTASSIUM CHLORIDE 29.8 MG/ML
20 INJECTION INTRAVENOUS
Status: DISCONTINUED | OUTPATIENT
Start: 2018-01-01 | End: 2018-01-01 | Stop reason: HOSPADM

## 2018-01-01 RX ORDER — NYSTATIN 100000/ML
500000 SUSPENSION, ORAL (FINAL DOSE FORM) ORAL 4 TIMES DAILY
COMMUNITY

## 2018-01-01 RX ORDER — IPRATROPIUM BROMIDE AND ALBUTEROL SULFATE 2.5; .5 MG/3ML; MG/3ML
3 SOLUTION RESPIRATORY (INHALATION) EVERY 4 HOURS
Status: DISCONTINUED | OUTPATIENT
Start: 2018-01-01 | End: 2018-01-01 | Stop reason: ALTCHOICE

## 2018-01-01 RX ORDER — OXYCODONE HCL 5 MG/5 ML
5 SOLUTION, ORAL ORAL
Status: DISCONTINUED | OUTPATIENT
Start: 2018-01-01 | End: 2018-01-01 | Stop reason: HOSPADM

## 2018-01-01 RX ORDER — NALOXONE HYDROCHLORIDE 0.4 MG/ML
.1-.4 INJECTION, SOLUTION INTRAMUSCULAR; INTRAVENOUS; SUBCUTANEOUS
Status: DISCONTINUED | OUTPATIENT
Start: 2018-01-01 | End: 2018-01-01 | Stop reason: HOSPADM

## 2018-01-01 RX ORDER — PIPERACILLIN SODIUM, TAZOBACTAM SODIUM 4; .5 G/20ML; G/20ML
4.5 INJECTION, POWDER, LYOPHILIZED, FOR SOLUTION INTRAVENOUS EVERY 6 HOURS
Status: DISCONTINUED | OUTPATIENT
Start: 2018-01-01 | End: 2018-01-01

## 2018-01-01 RX ORDER — ONDANSETRON 4 MG/1
4 TABLET, ORALLY DISINTEGRATING ORAL EVERY 6 HOURS PRN
Status: DISCONTINUED | OUTPATIENT
Start: 2018-01-01 | End: 2018-01-01 | Stop reason: HOSPADM

## 2018-01-01 RX ORDER — MEROPENEM 1 G/1
1 INJECTION, POWDER, FOR SOLUTION INTRAVENOUS EVERY 12 HOURS
Status: DISCONTINUED | OUTPATIENT
Start: 2018-01-01 | End: 2018-01-01 | Stop reason: HOSPADM

## 2018-01-01 RX ORDER — POTASSIUM CL/LIDO/0.9 % NACL 10MEQ/0.1L
10 INTRAVENOUS SOLUTION, PIGGYBACK (ML) INTRAVENOUS
Status: DISCONTINUED | OUTPATIENT
Start: 2018-01-01 | End: 2018-01-01 | Stop reason: HOSPADM

## 2018-01-01 RX ORDER — DEXTROMETHORPHAN POLISTIREX 30 MG/5ML
30 SUSPENSION ORAL 2 TIMES DAILY PRN
Status: DISCONTINUED | OUTPATIENT
Start: 2018-01-01 | End: 2018-01-01 | Stop reason: HOSPADM

## 2018-01-01 RX ORDER — LORAZEPAM 0.5 MG/1
0.25 TABLET ORAL EVERY 8 HOURS PRN
Status: DISCONTINUED | OUTPATIENT
Start: 2018-01-01 | End: 2018-01-01 | Stop reason: HOSPADM

## 2018-01-01 RX ORDER — NICOTINE POLACRILEX 4 MG
15-30 LOZENGE BUCCAL
Status: DISCONTINUED | OUTPATIENT
Start: 2018-01-01 | End: 2018-01-01 | Stop reason: HOSPADM

## 2018-01-01 RX ORDER — ONDANSETRON 2 MG/ML
4 INJECTION INTRAMUSCULAR; INTRAVENOUS EVERY 6 HOURS PRN
Status: DISCONTINUED | OUTPATIENT
Start: 2018-01-01 | End: 2018-01-01 | Stop reason: HOSPADM

## 2018-01-01 RX ORDER — PROCHLORPERAZINE 25 MG
12.5 SUPPOSITORY, RECTAL RECTAL EVERY 12 HOURS PRN
Status: DISCONTINUED | OUTPATIENT
Start: 2018-01-01 | End: 2018-01-01 | Stop reason: HOSPADM

## 2018-01-01 RX ORDER — ALUMINA, MAGNESIA, AND SIMETHICONE 2400; 2400; 240 MG/30ML; MG/30ML; MG/30ML
20 SUSPENSION ORAL EVERY 4 HOURS PRN
Status: DISCONTINUED | OUTPATIENT
Start: 2018-01-01 | End: 2018-01-01 | Stop reason: DRUGHIGH

## 2018-01-01 RX ORDER — IOPAMIDOL 755 MG/ML
58 INJECTION, SOLUTION INTRAVASCULAR ONCE
Status: COMPLETED | OUTPATIENT
Start: 2018-01-01 | End: 2018-01-01

## 2018-01-01 RX ORDER — ACETYLCYSTEINE 200 MG/ML
2 SOLUTION ORAL; RESPIRATORY (INHALATION) EVERY 4 HOURS
Status: DISCONTINUED | OUTPATIENT
Start: 2018-01-01 | End: 2018-01-01 | Stop reason: HOSPADM

## 2018-01-01 RX ORDER — SODIUM CHLORIDE 9 MG/ML
INJECTION, SOLUTION INTRAVENOUS CONTINUOUS
Status: DISCONTINUED | OUTPATIENT
Start: 2018-01-01 | End: 2018-01-01

## 2018-01-01 RX ORDER — LORAZEPAM 2 MG/ML
0.5 INJECTION INTRAMUSCULAR ONCE
Status: COMPLETED | OUTPATIENT
Start: 2018-01-01 | End: 2018-01-01

## 2018-01-01 RX ORDER — MEROPENEM 1 G/1
1 INJECTION, POWDER, FOR SOLUTION INTRAVENOUS EVERY 8 HOURS
Status: DISCONTINUED | OUTPATIENT
Start: 2018-01-01 | End: 2018-01-01 | Stop reason: DRUGHIGH

## 2018-01-01 RX ORDER — POTASSIUM CHLORIDE 1500 MG/1
20-40 TABLET, EXTENDED RELEASE ORAL
Status: DISCONTINUED | OUTPATIENT
Start: 2018-01-01 | End: 2018-01-01 | Stop reason: HOSPADM

## 2018-01-01 RX ADMIN — POTASSIUM CHLORIDE 40 MEQ: 1500 TABLET, EXTENDED RELEASE ORAL at 07:00

## 2018-01-01 RX ADMIN — OXYCODONE HYDROCHLORIDE 5 MG: 5 SOLUTION ORAL at 08:10

## 2018-01-01 RX ADMIN — Medication 40 MG: at 09:58

## 2018-01-01 RX ADMIN — Medication 3 MG: at 03:13

## 2018-01-01 RX ADMIN — ACETYLCYSTEINE 2 ML: 200 INHALANT RESPIRATORY (INHALATION) at 15:05

## 2018-01-01 RX ADMIN — DILTIAZEM HYDROCHLORIDE 90 MG: 30 TABLET, FILM COATED ORAL at 08:36

## 2018-01-01 RX ADMIN — ACETAMINOPHEN 975 MG: 160 SUSPENSION ORAL at 19:42

## 2018-01-01 RX ADMIN — ALBUTEROL SULFATE 2.5 MG: 2.5 SOLUTION RESPIRATORY (INHALATION) at 11:36

## 2018-01-01 RX ADMIN — Medication 15 ML: at 09:12

## 2018-01-01 RX ADMIN — SODIUM CHLORIDE SOLN NEBU 3% 3 ML: 3 NEBU SOLN at 10:36

## 2018-01-01 RX ADMIN — PIPERACILLIN SODIUM,TAZOBACTAM SODIUM 4.5 G: 4; .5 INJECTION, POWDER, FOR SOLUTION INTRAVENOUS at 12:41

## 2018-01-01 RX ADMIN — SODIUM CHLORIDE 85 ML: 9 INJECTION, SOLUTION INTRAVENOUS at 20:49

## 2018-01-01 RX ADMIN — IPRATROPIUM BROMIDE AND ALBUTEROL SULFATE 3 ML: .5; 3 SOLUTION RESPIRATORY (INHALATION) at 23:58

## 2018-01-01 RX ADMIN — IPRATROPIUM BROMIDE AND ALBUTEROL SULFATE 3 ML: .5; 3 SOLUTION RESPIRATORY (INHALATION) at 19:08

## 2018-01-01 RX ADMIN — LORAZEPAM 0.5 MG: 2 INJECTION INTRAMUSCULAR; INTRAVENOUS at 03:24

## 2018-01-01 RX ADMIN — UMECLIDINIUM BROMIDE AND VILANTEROL TRIFENATATE 1 PUFF: 62.5; 25 POWDER RESPIRATORY (INHALATION) at 08:48

## 2018-01-01 RX ADMIN — IPRATROPIUM BROMIDE AND ALBUTEROL SULFATE 3 ML: .5; 3 SOLUTION RESPIRATORY (INHALATION) at 23:15

## 2018-01-01 RX ADMIN — OXYCODONE HYDROCHLORIDE 5 MG: 5 SOLUTION ORAL at 03:28

## 2018-01-01 RX ADMIN — METOPROLOL TARTRATE 25 MG: 25 TABLET ORAL at 02:20

## 2018-01-01 RX ADMIN — IPRATROPIUM BROMIDE AND ALBUTEROL SULFATE 3 ML: .5; 3 SOLUTION RESPIRATORY (INHALATION) at 08:02

## 2018-01-01 RX ADMIN — VANCOMYCIN HYDROCHLORIDE 1500 MG: 5 INJECTION, POWDER, LYOPHILIZED, FOR SOLUTION INTRAVENOUS at 03:02

## 2018-01-01 RX ADMIN — METOPROLOL TARTRATE 25 MG: 25 TABLET ORAL at 19:04

## 2018-01-01 RX ADMIN — ACETAMINOPHEN 975 MG: 160 SUSPENSION ORAL at 13:36

## 2018-01-01 RX ADMIN — ALBUTEROL SULFATE 2.5 MG: 2.5 SOLUTION RESPIRATORY (INHALATION) at 02:52

## 2018-01-01 RX ADMIN — SODIUM CHLORIDE SOLN NEBU 3% 3 ML: 3 NEBU SOLN at 23:59

## 2018-01-01 RX ADMIN — ENOXAPARIN SODIUM 90 MG: 100 INJECTION SUBCUTANEOUS at 17:34

## 2018-01-01 RX ADMIN — METOPROLOL TARTRATE 1.5 MG: 5 INJECTION, SOLUTION INTRAVENOUS at 23:00

## 2018-01-01 RX ADMIN — ALBUTEROL SULFATE 2.5 MG: 2.5 SOLUTION RESPIRATORY (INHALATION) at 16:25

## 2018-01-01 RX ADMIN — SODIUM CHLORIDE SOLN NEBU 3% 3 ML: 3 NEBU SOLN at 07:34

## 2018-01-01 RX ADMIN — MEROPENEM 1 G: 1 INJECTION, POWDER, FOR SOLUTION INTRAVENOUS at 00:45

## 2018-01-01 RX ADMIN — DILTIAZEM HYDROCHLORIDE 90 MG: 30 TABLET, FILM COATED ORAL at 08:13

## 2018-01-01 RX ADMIN — SODIUM CHLORIDE 1000 ML: 9 INJECTION, SOLUTION INTRAVENOUS at 07:55

## 2018-01-01 RX ADMIN — INSULIN ASPART 1 UNITS: 100 INJECTION, SOLUTION INTRAVENOUS; SUBCUTANEOUS at 12:23

## 2018-01-01 RX ADMIN — MEROPENEM 1 G: 1 INJECTION, POWDER, FOR SOLUTION INTRAVENOUS at 15:09

## 2018-01-01 RX ADMIN — OXYCODONE HYDROCHLORIDE 5 MG: 5 SOLUTION ORAL at 11:31

## 2018-01-01 RX ADMIN — PANTOPRAZOLE SODIUM 40 MG: 40 TABLET, DELAYED RELEASE ORAL at 10:08

## 2018-01-01 RX ADMIN — METOPROLOL TARTRATE 2.5 MG: 5 INJECTION, SOLUTION INTRAVENOUS at 08:22

## 2018-01-01 RX ADMIN — METOPROLOL TARTRATE 25 MG: 25 TABLET ORAL at 01:05

## 2018-01-01 RX ADMIN — SODIUM CHLORIDE SOLN NEBU 3% 3 ML: 3 NEBU SOLN at 08:31

## 2018-01-01 RX ADMIN — SODIUM CHLORIDE, PRESERVATIVE FREE: 5 INJECTION INTRAVENOUS at 23:41

## 2018-01-01 RX ADMIN — IPRATROPIUM BROMIDE AND ALBUTEROL SULFATE 3 ML: .5; 3 SOLUTION RESPIRATORY (INHALATION) at 10:57

## 2018-01-01 RX ADMIN — POTASSIUM CHLORIDE 20 MEQ: 1.5 POWDER, FOR SOLUTION ORAL at 13:39

## 2018-01-01 RX ADMIN — PAROXETINE HYDROCHLORIDE 20 MG: 20 TABLET, FILM COATED ORAL at 08:34

## 2018-01-01 RX ADMIN — SODIUM CHLORIDE SOLN NEBU 3%: 3 NEBU SOLN at 08:03

## 2018-01-01 RX ADMIN — METOPROLOL TARTRATE 25 MG: 25 TABLET ORAL at 01:18

## 2018-01-01 RX ADMIN — OXYCODONE HYDROCHLORIDE 5 MG: 5 SOLUTION ORAL at 18:43

## 2018-01-01 RX ADMIN — Medication 40 MG: at 11:38

## 2018-01-01 RX ADMIN — SODIUM CHLORIDE 500 ML: 9 INJECTION, SOLUTION INTRAVENOUS at 15:21

## 2018-01-01 RX ADMIN — OXYCODONE HYDROCHLORIDE 5 MG: 5 SOLUTION ORAL at 10:00

## 2018-01-01 RX ADMIN — MAGNESIUM SULFATE IN WATER 4 G: 40 INJECTION, SOLUTION INTRAVENOUS at 16:38

## 2018-01-01 RX ADMIN — VANCOMYCIN HYDROCHLORIDE 1500 MG: 5 INJECTION, POWDER, LYOPHILIZED, FOR SOLUTION INTRAVENOUS at 16:36

## 2018-01-01 RX ADMIN — SODIUM CHLORIDE SOLN NEBU 3% 3 ML: 3 NEBU SOLN at 19:09

## 2018-01-01 RX ADMIN — INSULIN ASPART 2 UNITS: 100 INJECTION, SOLUTION INTRAVENOUS; SUBCUTANEOUS at 17:36

## 2018-01-01 RX ADMIN — INSULIN ASPART 2 UNITS: 100 INJECTION, SOLUTION INTRAVENOUS; SUBCUTANEOUS at 18:45

## 2018-01-01 RX ADMIN — SODIUM CHLORIDE SOLN NEBU 3% 3 ML: 3 NEBU SOLN at 02:50

## 2018-01-01 RX ADMIN — DILTIAZEM HYDROCHLORIDE 90 MG: 30 TABLET, FILM COATED ORAL at 02:20

## 2018-01-01 RX ADMIN — DILTIAZEM HYDROCHLORIDE 90 MG: 30 TABLET, FILM COATED ORAL at 02:33

## 2018-01-01 RX ADMIN — METOPROLOL TARTRATE 25 MG: 25 TABLET ORAL at 17:35

## 2018-01-01 RX ADMIN — ENOXAPARIN SODIUM 90 MG: 100 INJECTION SUBCUTANEOUS at 15:22

## 2018-01-01 RX ADMIN — Medication 40 MG: at 06:54

## 2018-01-01 RX ADMIN — METOPROLOL TARTRATE 25 MG: 25 TABLET ORAL at 02:33

## 2018-01-01 RX ADMIN — OXYCODONE HYDROCHLORIDE 5 MG: 5 SOLUTION ORAL at 02:34

## 2018-01-01 RX ADMIN — PAROXETINE HYDROCHLORIDE 20 MG: 20 TABLET, FILM COATED ORAL at 08:13

## 2018-01-01 RX ADMIN — ALBUTEROL SULFATE 2.5 MG: 2.5 SOLUTION RESPIRATORY (INHALATION) at 19:43

## 2018-01-01 RX ADMIN — PIPERACILLIN SODIUM,TAZOBACTAM SODIUM 4.5 G: 4; .5 INJECTION, POWDER, FOR SOLUTION INTRAVENOUS at 11:38

## 2018-01-01 RX ADMIN — DILTIAZEM HYDROCHLORIDE 90 MG: 30 TABLET, FILM COATED ORAL at 19:42

## 2018-01-01 RX ADMIN — ENOXAPARIN SODIUM 90 MG: 100 INJECTION SUBCUTANEOUS at 18:33

## 2018-01-01 RX ADMIN — IPRATROPIUM BROMIDE AND ALBUTEROL SULFATE 3 ML: .5; 3 SOLUTION RESPIRATORY (INHALATION) at 02:49

## 2018-01-01 RX ADMIN — FUROSEMIDE 40 MG: 10 INJECTION, SOLUTION INTRAMUSCULAR; INTRAVENOUS at 18:35

## 2018-01-01 RX ADMIN — METOPROLOL TARTRATE 25 MG: 25 TABLET ORAL at 11:39

## 2018-01-01 RX ADMIN — DILTIAZEM HYDROCHLORIDE 90 MG: 30 TABLET, FILM COATED ORAL at 01:19

## 2018-01-01 RX ADMIN — IPRATROPIUM BROMIDE AND ALBUTEROL SULFATE 3 ML: .5; 3 SOLUTION RESPIRATORY (INHALATION) at 08:31

## 2018-01-01 RX ADMIN — METOPROLOL TARTRATE 2.5 MG: 5 INJECTION, SOLUTION INTRAVENOUS at 10:02

## 2018-01-01 RX ADMIN — ALBUTEROL SULFATE 2.5 MG: 2.5 SOLUTION RESPIRATORY (INHALATION) at 07:53

## 2018-01-01 RX ADMIN — METOPROLOL TARTRATE 25 MG: 25 TABLET ORAL at 18:39

## 2018-01-01 RX ADMIN — SODIUM CHLORIDE, PRESERVATIVE FREE 100 ML/HR: 5 INJECTION INTRAVENOUS at 01:25

## 2018-01-01 RX ADMIN — UMECLIDINIUM BROMIDE AND VILANTEROL TRIFENATATE 1 PUFF: 62.5; 25 POWDER RESPIRATORY (INHALATION) at 08:28

## 2018-01-01 RX ADMIN — SODIUM CHLORIDE SOLN NEBU 3% 4 ML: 3 NEBU SOLN at 23:11

## 2018-01-01 RX ADMIN — Medication 15 ML: at 19:42

## 2018-01-01 RX ADMIN — DILTIAZEM HYDROCHLORIDE 90 MG: 30 TABLET, FILM COATED ORAL at 20:57

## 2018-01-01 RX ADMIN — ACETAMINOPHEN 975 MG: 160 SUSPENSION ORAL at 12:34

## 2018-01-01 RX ADMIN — ACETYLCYSTEINE 2 ML: 200 INHALANT RESPIRATORY (INHALATION) at 07:53

## 2018-01-01 RX ADMIN — IPRATROPIUM BROMIDE AND ALBUTEROL SULFATE 3 ML: .5; 3 SOLUTION RESPIRATORY (INHALATION) at 23:24

## 2018-01-01 RX ADMIN — POTASSIUM CHLORIDE 20 MEQ: 1.5 POWDER, FOR SOLUTION ORAL at 10:00

## 2018-01-01 RX ADMIN — SODIUM CHLORIDE SOLN NEBU 3% 3 ML: 3 NEBU SOLN at 10:58

## 2018-01-01 RX ADMIN — ACETYLCYSTEINE 2 ML: 200 INHALANT RESPIRATORY (INHALATION) at 19:43

## 2018-01-01 RX ADMIN — IPRATROPIUM BROMIDE AND ALBUTEROL SULFATE 3 ML: .5; 3 SOLUTION RESPIRATORY (INHALATION) at 19:56

## 2018-01-01 RX ADMIN — Medication 15 ML: at 08:09

## 2018-01-01 RX ADMIN — ALBUTEROL SULFATE 2.5 MG: 2.5 SOLUTION RESPIRATORY (INHALATION) at 15:05

## 2018-01-01 RX ADMIN — IPRATROPIUM BROMIDE AND ALBUTEROL SULFATE 3 ML: .5; 3 SOLUTION RESPIRATORY (INHALATION) at 07:34

## 2018-01-01 RX ADMIN — DILTIAZEM HYDROCHLORIDE 90 MG: 30 TABLET, FILM COATED ORAL at 15:08

## 2018-01-01 RX ADMIN — OXYCODONE HYDROCHLORIDE 5 MG: 5 SOLUTION ORAL at 22:28

## 2018-01-01 RX ADMIN — ACETAMINOPHEN 975 MG: 160 SUSPENSION ORAL at 00:41

## 2018-01-01 RX ADMIN — PIPERACILLIN SODIUM,TAZOBACTAM SODIUM 4.5 G: 4; .5 INJECTION, POWDER, FOR SOLUTION INTRAVENOUS at 23:06

## 2018-01-01 RX ADMIN — ACETYLCYSTEINE 2 ML: 200 INHALANT RESPIRATORY (INHALATION) at 02:52

## 2018-01-01 RX ADMIN — PAROXETINE HYDROCHLORIDE 20 MG: 20 TABLET, FILM COATED ORAL at 08:36

## 2018-01-01 RX ADMIN — UMECLIDINIUM BROMIDE AND VILANTEROL TRIFENATATE 1 PUFF: 62.5; 25 POWDER RESPIRATORY (INHALATION) at 09:04

## 2018-01-01 RX ADMIN — PAROXETINE HYDROCHLORIDE 20 MG: 20 TABLET, FILM COATED ORAL at 11:40

## 2018-01-01 RX ADMIN — IPRATROPIUM BROMIDE AND ALBUTEROL SULFATE 3 ML: .5; 3 SOLUTION RESPIRATORY (INHALATION) at 02:34

## 2018-01-01 RX ADMIN — IPRATROPIUM BROMIDE AND ALBUTEROL SULFATE 3 ML: .5; 3 SOLUTION RESPIRATORY (INHALATION) at 15:50

## 2018-01-01 RX ADMIN — SODIUM CHLORIDE SOLN NEBU 3% 3 ML: 3 NEBU SOLN at 15:51

## 2018-01-01 RX ADMIN — METOPROLOL TARTRATE 25 MG: 25 TABLET ORAL at 10:01

## 2018-01-01 RX ADMIN — PIPERACILLIN SODIUM,TAZOBACTAM SODIUM 4.5 G: 4; .5 INJECTION, POWDER, FOR SOLUTION INTRAVENOUS at 06:55

## 2018-01-01 RX ADMIN — Medication 15 ML: at 12:18

## 2018-01-01 RX ADMIN — SODIUM CHLORIDE SOLN NEBU 3% 3 ML: 3 NEBU SOLN at 19:57

## 2018-01-01 RX ADMIN — OXYCODONE HYDROCHLORIDE 5 MG: 5 SOLUTION ORAL at 23:02

## 2018-01-01 RX ADMIN — SODIUM CHLORIDE, PRESERVATIVE FREE: 5 INJECTION INTRAVENOUS at 12:07

## 2018-01-01 RX ADMIN — METOPROLOL TARTRATE 25 MG: 25 TABLET ORAL at 10:06

## 2018-01-01 RX ADMIN — PIPERACILLIN SODIUM,TAZOBACTAM SODIUM 4.5 G: 4; .5 INJECTION, POWDER, FOR SOLUTION INTRAVENOUS at 19:11

## 2018-01-01 RX ADMIN — SODIUM CHLORIDE, PRESERVATIVE FREE: 5 INJECTION INTRAVENOUS at 19:05

## 2018-01-01 RX ADMIN — OXYCODONE HYDROCHLORIDE 5 MG: 5 SOLUTION ORAL at 19:11

## 2018-01-01 RX ADMIN — Medication 15 ML: at 23:59

## 2018-01-01 RX ADMIN — DILTIAZEM HYDROCHLORIDE 90 MG: 30 TABLET, FILM COATED ORAL at 01:05

## 2018-01-01 RX ADMIN — DILTIAZEM HYDROCHLORIDE 90 MG: 30 TABLET, FILM COATED ORAL at 11:40

## 2018-01-01 RX ADMIN — IPRATROPIUM BROMIDE AND ALBUTEROL SULFATE 3 ML: .5; 3 SOLUTION RESPIRATORY (INHALATION) at 20:33

## 2018-01-01 RX ADMIN — IPRATROPIUM BROMIDE AND ALBUTEROL SULFATE 3 ML: .5; 3 SOLUTION RESPIRATORY (INHALATION) at 14:32

## 2018-01-01 RX ADMIN — OXYCODONE HYDROCHLORIDE 5 MG: 5 SOLUTION ORAL at 22:40

## 2018-01-01 RX ADMIN — OXYCODONE HYDROCHLORIDE 5 MG: 5 SOLUTION ORAL at 12:43

## 2018-01-01 RX ADMIN — DILTIAZEM HYDROCHLORIDE 90 MG: 30 TABLET, FILM COATED ORAL at 13:40

## 2018-01-01 RX ADMIN — MEROPENEM 1 G: 1 INJECTION, POWDER, FOR SOLUTION INTRAVENOUS at 21:27

## 2018-01-01 RX ADMIN — MEROPENEM 1 G: 1 INJECTION, POWDER, FOR SOLUTION INTRAVENOUS at 09:18

## 2018-01-01 RX ADMIN — DILTIAZEM HYDROCHLORIDE 90 MG: 30 TABLET, FILM COATED ORAL at 19:00

## 2018-01-01 RX ADMIN — MEROPENEM 1 G: 1 INJECTION, POWDER, FOR SOLUTION INTRAVENOUS at 09:13

## 2018-01-01 RX ADMIN — PIPERACILLIN SODIUM,TAZOBACTAM SODIUM 4.5 G: 4; .5 INJECTION, POWDER, FOR SOLUTION INTRAVENOUS at 23:39

## 2018-01-01 RX ADMIN — IPRATROPIUM BROMIDE AND ALBUTEROL SULFATE 3 ML: .5; 3 SOLUTION RESPIRATORY (INHALATION) at 10:36

## 2018-01-01 RX ADMIN — IOPAMIDOL 58 ML: 755 INJECTION, SOLUTION INTRAVENOUS at 20:49

## 2018-01-01 RX ADMIN — VANCOMYCIN HYDROCHLORIDE 1500 MG: 5 INJECTION, POWDER, LYOPHILIZED, FOR SOLUTION INTRAVENOUS at 03:40

## 2018-01-01 RX ADMIN — INSULIN ASPART 1 UNITS: 100 INJECTION, SOLUTION INTRAVENOUS; SUBCUTANEOUS at 13:08

## 2018-01-01 RX ADMIN — OXYCODONE HYDROCHLORIDE 5 MG: 5 SOLUTION ORAL at 03:04

## 2018-01-01 RX ADMIN — ALUMINUM HYDROXIDE, MAGNESIUM HYDROXIDE, AND DIMETHICONE 20 ML: 400; 400; 40 SUSPENSION ORAL at 12:09

## 2018-01-01 RX ADMIN — METOPROLOL TARTRATE 25 MG: 25 TABLET ORAL at 09:19

## 2018-01-01 RX ADMIN — ACETYLCYSTEINE 2 ML: 200 INHALANT RESPIRATORY (INHALATION) at 23:26

## 2018-01-01 RX ADMIN — SODIUM CHLORIDE SOLN NEBU 3% 3 ML: 3 NEBU SOLN at 11:36

## 2018-01-01 RX ADMIN — DILTIAZEM HYDROCHLORIDE 90 MG: 30 TABLET, FILM COATED ORAL at 08:34

## 2018-01-01 RX ADMIN — ALBUTEROL SULFATE 2.5 MG: 2.5 SOLUTION RESPIRATORY (INHALATION) at 23:26

## 2018-01-01 RX ADMIN — POTASSIUM CHLORIDE 40 MEQ: 1.5 POWDER, FOR SOLUTION ORAL at 11:21

## 2018-01-01 RX ADMIN — PIPERACILLIN SODIUM,TAZOBACTAM SODIUM 4.5 G: 4; .5 INJECTION, POWDER, FOR SOLUTION INTRAVENOUS at 05:32

## 2018-01-01 RX ADMIN — SODIUM CHLORIDE SOLN NEBU 3% 4 ML: 3 NEBU SOLN at 23:24

## 2018-01-01 RX ADMIN — SODIUM CHLORIDE SOLN NEBU 3% 4 ML: 3 NEBU SOLN at 02:34

## 2018-01-01 ASSESSMENT — PAIN DESCRIPTION - DESCRIPTORS
DESCRIPTORS: ACHING
DESCRIPTORS: ACHING

## 2018-01-01 ASSESSMENT — ENCOUNTER SYMPTOMS
FEVER: 0
NUMBNESS: 0
COUGH: 0
SHORTNESS OF BREATH: 1
FATIGUE: 1
DIZZINESS: 0
HEADACHES: 0
WEAKNESS: 1

## 2018-03-02 NOTE — IP AVS SNAPSHOT
MRN:[id number]                      After Visit Summary   3/2/2018    Haley Mckeon    MRN: [id number]           Thank you!     Thank you for choosing [facility] for your care. Our goal is always to provide you with excellent care. Hearing back from our patients is one way we can continue to improve our services. Please take a few minutes to complete the written survey that you may receive in the mail after you visit with us. Thank you!        Patient Information     Date Of Birth          1950        Designated Caregiver       Most Recent Value    Caregiver    Will someone help with your care after discharge? no      About your hospital stay     You were admitted on:  March 2, 2018 You last received care in the:  [facility] [address] [facility]    You were discharged on:  March 6, 2018        Reason for your hospital stay       You were admitted for pneumonia with mucous plugging.                  Who to Call     For medical emergencies, please call 911.  For non-urgent questions about your medical care, please call your primary care provider or clinic, [phone]          Attending Provider     Provider Specialty    Wolfgang Ramos MD Emergency Medicine    [facility], Franky Cochran MD Internal Medicine       Primary Care Provider Office Phone # Fax #    Rhys Terry  [phone]      After Care Instructions     Activity - Up with nursing assistance           Advance Diet as Tolerated       Follow this diet upon discharge:  CURRENTLY NPO AND HOLDING TUBE FEEDS PRIOR TO BRONCHOSCOPY      Prior tube feeding: Adult Formula Bolus Feeding: TID Isosource 1.5; Route: Gastrostomy; 4; Can(s); Medication - Tube Feeding Flush Frequency: 30 mL water before and after tube feeds; 1.5 cans q  AM, 1.5 cans q Noon, 1 can q Evening            Alvarado catheter       To straight gravity drainage. Change catheter every 2 weeks and PRN for leaking or decreased uring output with signs of  "bladder distention. DO NOT change catheter without a specific MD order IF diagnosis of benign prostatic hypertrophy (BPH), neurogenic bladder, or other urological conditions            Oxygen - Nasal cannula       6 Lpm by nasal cannula to keep O2 sats 92% or greater.            Wound care       Site:   PEG tube  Instructions:  Routine cares.                  Follow-up Appointments     Follow Up and recommended labs and tests       Follow up with providers at [facility] for ongoing treatment.                  Pending Results     Date and Time Order Name Status Description    3/2/2018 2009 Blood culture Preliminary     3/2/2018 2009 Blood culture Preliminary             Statement of Approval     Ordered          03/06/18 0746  I have reviewed and agree with all the recommendations and orders detailed in this document.  EFFECTIVE NOW     Approved and electronically signed by:  Santos Downing MD             Admission Information     Date & Time Provider Department Dept. Phone    3/2/2018 Franky Dupree MD [place] [facility] [address] [facility] [phone]      Your Vitals Were     Blood Pressure Pulse Temperature Respirations Height Weight    134/80 (BP Location: Left arm) 100 97.4  F (36.3  C) (Oral) 20 1.676 m (5' 6\") 65.4 kg (144 lb 2.9 oz)    Pulse Oximetry BMI (Body Mass Index)                95% 23.27 kg/m2          MyChart Information     [employer] lets you send messages to your doctor, view your test results, renew your prescriptions, schedule appointments and more. To sign up, go to [email or web address] . Click on \"Log in\" on the left side of the screen, which will take you to the Welcome page. Then click on \"Sign up Now\" on the right side of the page.     You will be asked to enter the access code listed below, as well as some personal information. Please follow the directions to create your username and password.     Your access code is: [email or web address]  Expires: 6/4/2018  9:37 AM     Your access " code will  in 90 days. If you need help or a new code, please call your [facility] clinic or [phone].        Care EveryWhere ID     This is your Care EveryWhere ID. This could be used by other organizations to access your [facility] medical records  [id number]        Equal Access to Services     SIDDHARTHSANAM GUERO : Hadii aad ku hadasho Soomaali, waaxda luqadaha, qaybta kaalmada adeegyada, trinidad grossman aishwaryaqueta snow ronelaneudy montoya. So [facility] [phone].    ATENCIÓN: Si habla español, tiene a ko disposición servicios gratuitos de asistencia lingüística. Lauren al [phone].    We comply with applicable federal civil rights laws and [place] laws. We do not discriminate on the basis of race, color, national origin, age, disability, sex, sexual orientation, or gender identity.               Review of your medicines      START taking        Dose / Directions    meropenem 1 G vial   Commonly known as:  MERREM   Indication:  Healthcare-Associated Pneumonia   Used for:  Pneumonia of both lower lobes due to infectious organism        Dose:  1 g   Inject 1,000 mg (1 g) into the vein every 12 hours   Quantity:  30 each   Refills:  0         CONTINUE these medicines which have NOT CHANGED        Dose / Directions    acetaminophen 32 mg/mL solution   Commonly known as:  TYLENOL        Dose:  960 mg   Take 960 mg by mouth every 6 hours as needed for fever or mild pain   Refills:  0       albuterol 108 (90 BASE) MCG/ACT Inhaler   Commonly known as:  PROAIR HFA/PROVENTIL HFA/VENTOLIN HFA        Dose:  1-2 puff   Inhale 1-2 puffs into the lungs every 4 hours as needed for shortness of breath / dyspnea or wheezing   Refills:  0       alum & mag hydroxide-simethicone 200-200-20 MG/5ML Susp suspension   Commonly known as:  MYLANTA/MAALOX        Dose:  30 mL   30 mLs by Gastric Tube route every 4 hours as needed for indigestion   Refills:  0       DILTIAZEM HCL PO        Dose:  90 mg   90 mg by Gastric Tube route every 6 hours    Refills:  0       ENOXAPARIN SODIUM SC        Dose:  90 mg   Inject 90 mg Subcutaneous daily   Refills:  0       FIRST-MOUTHWASH BLM MT        Dose:  15 mL   Take 15 mLs by mouth 4 times daily 175--40/30 mL   Refills:  0       ipratropium 0.02 % neb solution   Commonly known as:  ATROVENT        Dose:  0.5 mg   Take 0.5 mg by nebulization 4 times daily   Refills:  0       LANsoprazole 3 mg/mL Susp   Commonly known as:  PREVACID        Dose:  30 mg   30 mg by Gastric Tube route every morning (before breakfast)   Refills:  0       METOPROLOL TARTRATE PO        Dose:  25 mg   Take 25 mg by mouth every 8 hours   Refills:  0       nystatin 385852 UNIT/ML suspension   Commonly known as:  MYCOSTATIN        Dose:  659034 Units   Take 500,000 Units by mouth 4 times daily   Refills:  0       oxyCODONE 5 MG/5ML solution   Commonly known as:  ROXICODONE        Dose:  5 mg   Take 5 mg by mouth every 3 hours as needed for moderate to severe pain   Refills:  0       PAROXETINE HCL PO        Dose:  20 mg   Take 20 mg by mouth daily   Refills:  0       senna-docusate 8.6-50 MG per tablet   Commonly known as:  SENOKOT-S;PERICOLACE        Dose:  1 tablet   Take 1 tablet by mouth daily as needed for constipation   Refills:  0       sodium chloride 0.9 % neb solution        Dose:  3 mL   Take 3 mLs by nebulization 4 times daily   Refills:  0       umeclidinium-vilanterol 62.5-25 MCG/INH oral inhaler   Commonly known as:  ANORO ELLIPTA        Dose:  1 puff   Inhale 1 puff into the lungs daily   Refills:  0            Where to get your medicines      Some of these will need a paper prescription and others can be bought over the counter. Ask your nurse if you have questions.     You don't need a prescription for these medications     meropenem 1 G vial                Protect others around you: Learn how to safely use, store and throw away your medicines at www.disposemymeds.org.        ANTIBIOTIC INSTRUCTION     You've Been  Prescribed an Antibiotic - Now What?  Your healthcare team thinks that you or your loved one might have an infection. Some infections can be treated with antibiotics, which are powerful, life-saving drugs. Like all medications, antibiotics have side effects and should only be used when necessary. There are some important things you should know about your antibiotic treatment.      Your healthcare team may run tests before you start taking an antibiotic.    Your team may take samples (e.g., from your blood, urine or other areas) to run tests to look for bacteria. These test can be important to determine if you need an antibiotic at all and, if you do, which antibiotic will work best.      Within a few days, your healthcare team might change or even stop your antibiotic.    Your team may start you on an antibiotic while they are working to find out what is making you sick.    Your team might change your antibiotic because test results show that a different antibiotic would be better to treat your infection.    In some cases, once your team has more information, they learn that you do not need an antibiotic at all. They may find out that you don't have an infection, or that the antibiotic you're taking won't work against your infection. For example, an infection caused by a virus can't be treated with antibiotics. Staying on an antibiotic when you don't need it is more likely to be harmful than helpful.      You may experience side effects from your antibiotic.    Like all medications, antibiotics have side effects. Some of these can be serious.    Let you healthcare team know if you have any known allergies when you are admitted to the hospital.    One significant side effect of nearly all antibiotics is the risk of severe and sometimes deadly diarrhea caused by Clostridium difficile (C. Difficile). This occurs when a person takes antibiotics because some good germs are destroyed. Antibiotic use allows C. diificile to  take over, putting patients at high risk for this serious infection.    As a patient or caregiver, it is important to understand your or your loved one's antibiotic treatment. It is especially important for caregivers to speak up when patients can't speak for themselves. Here are some important questions to ask your healthcare team.    What infection is this antibiotic treating and how do you know I have that infection?    What side effects might occur from this antibiotic?    How long will I need to take this antibiotic?    Is it safe to take this antibiotic with other medications or supplements (e.g., vitamins) that I am taking?     Are there any special directions I need to know about taking this antibiotic? For example, should I take it with food?    How will I be monitored to know whether my infection is responding to the antibiotic?    What tests may help to make sure the right antibiotic is prescribed for me?      Information provided by:  www.cdc.gov/getsmart  U.S. Department of Health and Human Services  Centers for disease Control and Prevention  National Center for Emerging and Zoonotic Infectious Diseases  Division of Healthcare Quality Promotion        Information about OPIOIDS     PRESCRIPTION OPIOIDS: WHAT YOU NEED TO KNOW    Prescription opioids can be used to help relieve moderate to severe pain and are often prescribed following a surgery or injury, or for certain health conditions. These medications can be an important part of treatment but also come with serious risks. It is important to work with your health care provider to make sure you are getting the safest, most effective care.    WHAT ARE THE RISKS AND SIDE EFFECTS OF OPIOID USE?  Prescription opioids carry serious risks of addiction and overdose, especially with prolonged use. An opioid overdose, often marked by slowed breathing can cause sudden death. The use of prescription opioids can have a number of side effects as well, even when  taken as directed:      Tolerance - meaning you might need to take more of a medication for the same pain relief    Physical dependence - meaning you have symptoms of withdrawal when a medication is stopped    Increased sensitivity to pain    Constipation    Nausea, vomiting, and dry mouth    Sleepiness and dizziness    Confusion    Depression    Low levels of testosterone that can result in lower sex drive, energy, and strength    Itching and sweating    RISKS ARE GREATER WITH:    History of drug misuse, substance use disorder, or overdose    Mental health conditions (such as depression or anxiety)    Sleep apnea    Older age (65 years or older)    Pregnancy    Avoid alcohol while taking prescription opioids.   Also, unless specifically advised by your health care provider, medications to avoid include:    Benzodiazepines (such as Xanax or Valium)    Muscle relaxants (such as Soma or Flexeril)    Hypnotics (such as Ambien or Lunesta)    Other prescription opioids    KNOW YOUR OPTIONS:  Talk to your health care provider about ways to manage your pain that do not involve prescription opioids. Some of these options may actually work better and have fewer risks and side effects:    Pain relievers such as acetaminophen, ibuprofen, and naproxen    Some medications that are also used for depression or seizures    Physical therapy and exercise    Cognitive behavioral therapy, a psychological, goal-directed approach, in which patients learn how to modify physical, behavioral, and emotional triggers of pain and stress    IF YOU ARE PRESCRIBED OPIOIDS FOR PAIN:    Never take opioids in greater amounts or more often than prescribed    Follow up with your primary health care provider and work together to create a plan on how to manage your pain.    Talk about ways to help manage your pain that do not involve prescription opioids    Talk about all concerns and side effects    Help prevent misuse and abuse    Never sell or share  prescription opioids    Never use another person's prescription opioids    Store prescription opioids in a secure place and out of reach of others (this may include visitors, children, friends, and family)    Visit www.cdc.gov/drugoverdose to learn about risks of opioid abuse and overdose    If you believe you may be struggling with addiction, tell your health care provider and ask for guidance or call [facility]'s National Helpline at 8-133-704-HELP    LEARN MORE / www.cdc.gov/drugoverdose/prescribing/guideline.html    Safely dispose of unused prescription opioids: Find your local drug take-back programs and more information about the importance of safe disposal at www.doseofreality.mn.gov             Medication List: This is a list of all your medications and when to take them. Check marks below indicate your daily home schedule. Keep this list as a reference.      Medications           Morning Afternoon Evening Bedtime As Needed    acetaminophen 32 mg/mL solution   Commonly known as:  TYLENOL   Take 960 mg by mouth every 6 hours as needed for fever or mild pain   Last time this was given:  975 mg on 3/5/2018  7:42 PM                                albuterol 108 (90 BASE) MCG/ACT Inhaler   Commonly known as:  PROAIR HFA/PROVENTIL HFA/VENTOLIN HFA   Inhale 1-2 puffs into the lungs every 4 hours as needed for shortness of breath / dyspnea or wheezing                                alum & mag hydroxide-simethicone 200-200-20 MG/5ML Susp suspension   Commonly known as:  MYLANTA/MAALOX   30 mLs by Gastric Tube route every 4 hours as needed for indigestion                                DILTIAZEM HCL PO   90 mg by Gastric Tube route every 6 hours   Last time this was given:  90 mg on 3/6/2018  8:36 AM                                ENOXAPARIN SODIUM SC   Inject 90 mg Subcutaneous daily   Last time this was given:  90 mg on 3/5/2018  5:34 PM                                FIRST-MOUTHWASH BLM MT   Take 15 mLs by mouth 4 times  daily 784--40/30 mL                                ipratropium 0.02 % neb solution   Commonly known as:  ATROVENT   Take 0.5 mg by nebulization 4 times daily                                LANsoprazole 3 mg/mL Susp   Commonly known as:  PREVACID   30 mg by Gastric Tube route every morning (before breakfast)                                meropenem 1 G vial   Commonly known as:  MERREM   Inject 1,000 mg (1 g) into the vein every 12 hours   Last time this was given:  1 g on 3/6/2018  9:18 AM                                METOPROLOL TARTRATE PO   Take 25 mg by mouth every 8 hours   Last time this was given:  25 mg on 3/6/2018  9:19 AM                                nystatin 979966 UNIT/ML suspension   Commonly known as:  MYCOSTATIN   Take 500,000 Units by mouth 4 times daily                                oxyCODONE 5 MG/5ML solution   Commonly known as:  ROXICODONE   Take 5 mg by mouth every 3 hours as needed for moderate to severe pain   Last time this was given:  5 mg on 3/6/2018  3:04 AM                                PAROXETINE HCL PO   Take 20 mg by mouth daily   Last time this was given:  20 mg on 3/6/2018  8:36 AM                                senna-docusate 8.6-50 MG per tablet   Commonly known as:  SENOKOT-S;PERICOLACE   Take 1 tablet by mouth daily as needed for constipation                                sodium chloride 0.9 % neb solution   Take 3 mLs by nebulization 4 times daily                                umeclidinium-vilanterol 62.5-25 MCG/INH oral inhaler   Commonly known as:  ANORO ELLIPTA   Inhale 1 puff into the lungs daily   Last time this was given:  1 puff on 3/5/2018  8:48 AM

## 2018-03-02 NOTE — IP AVS SNAPSHOT
"[place] [address] [facility]: [phone]                                              INTERAGENCY TRANSFER FORM - LAB / IMAGING / EKG / EMG RESULTS   3/2/2018                    Hospital Admission Date: 3/2/2018  DAVE NOGUERA   : 1950  Sex: Female        Attending Provider: Franky Dupree MD     Allergies:  Aloe    Infection:  None   Service:  HOSPITALIST    Ht:  1.676 m (5' 6\")   Wt:  65.4 kg (144 lb 2.9 oz)   Admission Wt:  64 kg (141 lb)    BMI:  23.27 kg/m 2   BSA:  1.75 m 2            Patient PCP Information     Provider PCP Type    Rhys Terry DO General         Lab Results - 3 Days      Glucose by meter [[id number]] (Abnormal)  Resulted: 18 0350, Result status: Final result    Ordering provider: Franky Dupree MD  18 015 Resulting lab: POINT OF CARE TEST, GLUCOSE    Specimen Information    Type Source Collected On     18 0154          Components       Value Reference Range Flag Lab   Glucose 138 70 - 99 mg/dL H 170            Blood culture [[id number]]  Resulted: 18 0122, Result status: Preliminary result    Ordering provider: Wolfgang Ramos MD  18 Resulting lab: [facility]    Specimen Information    Type Source Collected On   Blood Arm, Right 18 2251   Comment:  Right Arm          Components       Value Reference Range Flag Lab   Specimen Description Blood Right Arm      Special Requests Aerobic and anaerobic bottles received   FrStHsLb   Culture Micro No growth after 3 days   75            Blood culture [[id number]]  Resulted: 18 0122, Result status: Preliminary result    Ordering provider: Wolfgang Ramos MD  18 Resulting lab: [facility]    Specimen Information    Type Source Collected On   Blood Arm, Right 18 2040   Comment:  Right Arm          Components       Value Reference Range Flag Lab   Specimen Description Blood Right " Arm      Special Requests Aerobic and anaerobic bottles received   FrStHsLb   Culture Micro No growth after 4 days   75            Glucose by meter [[id number]] (Abnormal)  Resulted: 03/05/18 2313, Result status: Final result    Ordering provider: Franky Dupree MD  03/05/18 2100 Resulting lab: POINT OF CARE TEST, GLUCOSE    Specimen Information    Type Source Collected On     03/05/18 2100          Components       Value Reference Range Flag Lab   Glucose 210 70 - 99 mg/dL H 170            Potassium [[id number]]  Resulted: 03/05/18 1823, Result status: Final result    Ordering provider: Santos Downing MD  03/05/18 1607 Resulting lab: [facility]    Specimen Information    Type Source Collected On   Blood  03/05/18 1745          Components       Value Reference Range Flag Lab   Potassium 4.2 3.4 - 5.3 mmol/L  FrStHsLb            Glucose by meter [[id number]] (Abnormal)  Resulted: 03/05/18 1749, Result status: Final result    Ordering provider: Franky Dupree MD  03/05/18 1736 Resulting lab: POINT OF CARE TEST, GLUCOSE    Specimen Information    Type Source Collected On     03/05/18 1736          Components       Value Reference Range Flag Lab   Glucose 238 70 - 99 mg/dL H 170            Clostridium difficile toxin B PCR [[id number]]  Resulted: 03/05/18 1522, Result status: Final result    Ordering provider: Santos Downing MD  03/05/18 1154 Resulting lab: [facility]    Specimen Information    Type Source Collected On   Feces  03/05/18 1140          Components       Value Reference Range Flag Lab   Specimen Description Feces   FrStHsLb   C Diff Toxin B PCR Negative NEG^Negative  51   Comment:         Negative: Clostridium difficile target DNA sequences NOT detected, presumed   negative for Clostridium difficile toxin B or the number of bacteria present   may be below the limit of detection for the test.  FDA approved assay performed using Leixir  real-time PCR.  A negative result does not exclude actual disease due to Clostridium difficile   and may be due to improper collection, handling and storage of the specimen   or the number of organisms in the specimen is below the detection limit of the   assay.              Glucose by meter [[id number]] (Abnormal)  Resulted: 03/05/18 1503, Result status: Final result    Ordering provider: Franky Dupree MD  03/05/18 1221 Resulting lab: POINT OF CARE TEST, GLUCOSE    Specimen Information    Type Source Collected On     03/05/18 1221          Components       Value Reference Range Flag Lab   Glucose 145 70 - 99 mg/dL H 170            Glucose by meter [[id number]] (Abnormal)  Resulted: 03/05/18 1503, Result status: Final result    Ordering provider: Franky Dupree MD  03/05/18 0823 Resulting lab: POINT OF CARE TEST, GLUCOSE    Specimen Information    Type Source Collected On     03/05/18 0823          Components       Value Reference Range Flag Lab   Glucose 103 70 - 99 mg/dL H 170            Procalcitonin [[id number]]  Resulted: 03/05/18 1154, Result status: Final result    Ordering provider: Santos Downing MD  03/05/18 0903 Resulting lab: [facility]    Specimen Information    Type Source Collected On     03/05/18 0903          Components       Value Reference Range Flag Lab   Procalcitonin 0.82 ng/ml  Critical access hospital   Comment:         0.50-1.99 ng/ml  Moderate risk of systemic infection.  Recommendation:   Recommend antibiotics. Evaluate culture results and clinical features to   target antibacterial therapy. Obtain blood cultures and other relevant   cultures if not done.  If empiric antibiotics were started, recheck PCT in: 2 days to guide   antibiotic de-escalation.  Discontinue or de-escalate antibiotics when PCT   concentration is <80% of peak or abs PCT <0.5. If empiric antibiotics were NOT   started, recheck PCT in 6-24 hours to re-evaluate need for antibiotics.              CBC with  platelets [[id number]] (Abnormal)  Resulted: 03/05/18 1020, Result status: Final result    Ordering provider: Franky Dupree MD  03/05/18 0000 Resulting lab: [facility]    Specimen Information    Type Source Collected On   Blood  03/05/18 0903          Components       Value Reference Range Flag Lab   WBC 26.7 4.0 - 11.0 10e9/L H FrStHsLb   RBC Count 3.08 3.8 - 5.2 10e12/L L FrStHsLb   Hemoglobin 7.8 11.7 - 15.7 g/dL L FrStHsLb   Hematocrit 25.9 35.0 - 47.0 % L FrStHsLb   MCV 84 78 - 100 fl  FrStHsLb   MCH 25.3 26.5 - 33.0 pg L FrStHsLb   MCHC 30.1 31.5 - 36.5 g/dL L FrStHsLb   RDW 17.9 10.0 - 15.0 % H FrStHsLb   Platelet Count 829 150 - 450 10e9/L H FrStHsLb            Basic metabolic panel [[id number]] (Abnormal)  Resulted: 03/05/18 0943, Result status: Final result    Ordering provider: Franky Dupree MD  03/05/18 0000 Resulting lab: [facility]    Specimen Information    Type Source Collected On   Blood  03/05/18 0903          Components       Value Reference Range Flag Lab   Sodium 140 133 - 144 mmol/L  FrStHsLb   Potassium 3.1 3.4 - 5.3 mmol/L L FrStHsLb   Chloride 100 94 - 109 mmol/L  FrStHsLb   Carbon Dioxide 32 20 - 32 mmol/L  FrStHsLb   Anion Gap 8 3 - 14 mmol/L  FrStHsLb   Glucose 105 70 - 99 mg/dL H FrStHsLb   Urea Nitrogen 25 7 - 30 mg/dL  FrStHsLb   Creatinine 1.28 0.52 - 1.04 mg/dL H FrStHsLb   GFR Estimate 42 >60 mL/min/1.7m2 L FrStHsLb   Comment:  Non  GFR Calc   GFR Estimate If Black 50 >60 mL/min/1.7m2 L FrStHsLb   Comment:  African American GFR Calc   Calcium 8.7 8.5 - 10.1 mg/dL  FrStHsLb            Magnesium [[id number]]  Resulted: 03/05/18 0943, Result status: Final result    Ordering provider: Franky Dupree MD  03/05/18 0000 Resulting lab: [facility]    Specimen Information    Type Source Collected On   Blood  03/05/18 0903          Components       Value Reference Range Flag Lab   Magnesium 2.0 1.6 - 2.3 mg/dL   FrStHsLb            Phosphorus [[id number]]  Resulted: 03/05/18 0943, Result status: Final result    Ordering provider: Franky Dupree MD  03/05/18 0000 Resulting lab: [facility]    Specimen Information    Type Source Collected On   Blood  03/05/18 0903          Components       Value Reference Range Flag Lab   Phosphorus 3.6 2.5 - 4.5 mg/dL  FrStHsLb            Prealbumin [[id number]] (Abnormal)  Resulted: 03/05/18 0943, Result status: Final result    Ordering provider: Franky Dupree MD  03/05/18 0000 Resulting lab: [facility]    Specimen Information    Type Source Collected On   Blood  03/05/18 0903          Components       Value Reference Range Flag Lab   Prealbumin 5 15 - 45 mg/dL L FrStHsLb            Sputum Culture Aerobic Bacterial [[id number]]  Resulted: 03/05/18 0847, Result status: Final result    Ordering provider: Franky Dupree MD  03/03/18 0028 Resulting lab: [facility]    Specimen Information    Type Source Collected On   Sputum  03/03/18 1130          Components       Value Reference Range Flag Lab   Specimen Description Sputum      Culture Micro --   225   Result:         Moderate growth  Normal [place]              Glucose by meter [[id number]] (Abnormal)  Resulted: 03/05/18 0252, Result status: Final result    Ordering provider: Franky Dupree MD  03/05/18 0217 Resulting lab: POINT OF CARE TEST, GLUCOSE    Specimen Information    Type Source Collected On     03/05/18 0217          Components       Value Reference Range Flag Lab   Glucose 143 70 - 99 mg/dL H 170            Glucose by meter [[id number]] (Abnormal)  Resulted: 03/04/18 2235, Result status: Final result    Ordering provider: Franky Dupree MD  03/04/18 2202 Resulting lab: POINT OF CARE TEST, GLUCOSE    Specimen Information    Type Source Collected On     03/04/18 2202          Components       Value Reference Range Flag Lab   Glucose 122 70 - 99  mg/dL H 170            Glucose by meter [[id number]] (Abnormal)  Resulted: 03/04/18 1713, Result status: Final result    Ordering provider: Franky Dupree MD  03/04/18 1701 Resulting lab: POINT OF CARE TEST, GLUCOSE    Specimen Information    Type Source Collected On     03/04/18 1701          Components       Value Reference Range Flag Lab   Glucose 197 70 - 99 mg/dL H 170            Lactic acid whole blood [[id number]]  Resulted: 03/04/18 1708, Result status: Final result    Ordering provider: Santos Downing MD  03/04/18 1445 Resulting lab: [facility]    Specimen Information    Type Source Collected On   Blood  03/04/18 1657          Components       Value Reference Range Flag Lab   Lactic Acid 1.8 0.7 - 2.0 mmol/L  FrStHsLb            Potassium [[id number]]  Resulted: 03/04/18 1453, Result status: Final result    Ordering provider: Franky Dupree MD  03/04/18 1305 Resulting lab: [facility]    Specimen Information    Type Source Collected On   Blood  03/04/18 1425          Components       Value Reference Range Flag Lab   Potassium 3.4 3.4 - 5.3 mmol/L  FrStHsLb            Nt probnp inpatient [[id number]] (Abnormal)  Resulted: 03/04/18 1453, Result status: Final result    Ordering provider: Franky Dupree MD  03/04/18 1425 Resulting lab: [facility]    Specimen Information    Type Source Collected On     03/04/18 1425          Components       Value Reference Range Flag Lab   N-Terminal Pro BNP Inpatient 5367 0 - 900 pg/mL H FrStHsLb   Comment:            Reference range shown and results flagged as abnormal are suggested inpatient   cut points for confirming diagnosis if CHF in an acute setting. Establishing a   baseline value for each individual patient is useful for follow-up. An   inpatient or emergency department NT-proPBNP <300 pg/mL effectively rules out   acute CHF, with 99% negative predictive value.  The outpatient non-acute reference range  for ruling out CHF is:   0-125 pg/mL (age 18 to less than 75)   0-450 pg/mL (age 75 yrs and older)              Lactic acid level STAT [[id number]] (Abnormal)  Resulted: 03/04/18 1440, Result status: Final result    Ordering provider: Santos Downing MD  03/04/18 1357 Resulting lab: [facility]    Specimen Information    Type Source Collected On   Blood  03/04/18 1425          Components       Value Reference Range Flag Lab   Lactic Acid 2.3 0.7 - 2.0 mmol/L H FrStHsLb            Glucose by meter [[id number]] (Abnormal)  Resulted: 03/04/18 1250, Result status: Final result    Ordering provider: Franky Dupree MD  03/04/18 1236 Resulting lab: POINT OF CARE TEST, GLUCOSE    Specimen Information    Type Source Collected On     03/04/18 1236          Components       Value Reference Range Flag Lab   Glucose 178 70 - 99 mg/dL H 170            UA with Microscopic reflex to Culture [[id number]] (Abnormal)  Resulted: 03/04/18 0842, Result status: Final result    Ordering provider: Santos Downing MD  03/04/18 0735 Resulting lab: [facility]    Specimen Information    Type Source Collected On   Catheterized Urine Urine catheter 03/04/18 0809          Components       Value Reference Range Flag Lab   Color Urine Light Yellow   FrStHsLb   Appearance Urine Slightly Cloudy   FrStHsLb   Glucose Urine Negative NEG^Negative mg/dL  FrStHsLb   Bilirubin Urine Negative NEG^Negative  FrStHsLb   Ketones Urine Negative NEG^Negative mg/dL  FrStHsLb   Specific [place] Urine 1.009 1.003 - 1.035  FrStHsLb   Blood Urine Trace NEG^Negative A FrStHsLb   pH Urine 6.5 5.0 - 7.0 pH  FrStHsLb   Protein Albumin Urine 30 NEG^Negative mg/dL A FrStHsLb   Urobilinogen mg/dL Normal 0.0 - 2.0 mg/dL  FrStHsLb   Nitrite Urine Negative NEG^Negative  FrStHsLb   Leukocyte Esterase Urine Trace NEG^Negative A FrStHsLb   Source Catheterized Urine   FrStHsLb   WBC Urine 7 0 - 5 /HPF H FrStHsLb   RBC Urine 6 0 - 2 /HPF H FrStHsLb    Squamous Epithelial /HPF Urine 1 0 - 1 /HPF  FrStHsLb   Mucous Urine Present NEG^Negative /LPF A FrStHsLb            Sodium random urine [[id number]]  Resulted: 03/04/18 0836, Result status: Final result    Ordering provider: Santos Downing MD  03/04/18 0735 Resulting lab: [facility]    Specimen Information    Type Source Collected On   Urine Urine catheter 03/04/18 0809          Components       Value Reference Range Flag Lab   Sodium Urine mmol/L 51 mmol/L  FrStHsLb            Glucose by meter [[id number]]  Resulted: 03/04/18 0822, Result status: Final result    Ordering provider: Franky Dupree MD  03/04/18 0811 Resulting lab: POINT OF CARE TEST, GLUCOSE    Specimen Information    Type Source Collected On     03/04/18 0811          Components       Value Reference Range Flag Lab   Glucose 99 70 - 99 mg/dL  170            Procalcitonin [[id number]]  Resulted: 03/04/18 0816, Result status: Final result    Ordering provider: Franky Dupree MD  03/04/18 0610 Resulting lab: [facility]    Specimen Information    Type Source Collected On     03/04/18 0610          Components       Value Reference Range Flag Lab   Procalcitonin 0.74 ng/ml  FrStHsLb   Comment:         0.50-1.99 ng/ml  Moderate risk of systemic infection.  Recommendation:   Recommend antibiotics. Evaluate culture results and clinical features to   target antibacterial therapy. Obtain blood cultures and other relevant   cultures if not done.  If empiric antibiotics were started, recheck PCT in: 2 days to guide   antibiotic de-escalation.  Discontinue or de-escalate antibiotics when PCT   concentration is <80% of peak or abs PCT <0.5. If empiric antibiotics were NOT   started, recheck PCT in 6-24 hours to re-evaluate need for antibiotics.              Hemoglobin A1c [[id number]] (Abnormal)  Resulted: 03/04/18 0646, Result status: Final result    Ordering provider: Franky Dupree MD  03/04/18 0610 Resulting  lab: [facility]    Specimen Information    Type Source Collected On     03/04/18 0610          Components       Value Reference Range Flag Lab   Hemoglobin A1C 6.3 4.3 - 6.0 % H FrStHsLb            Basic metabolic panel [[id number]] (Abnormal)  Resulted: 03/04/18 0638, Result status: Final result    Ordering provider: Franky Dupree MD  03/04/18 0000 Resulting lab: [facility]    Specimen Information    Type Source Collected On   Blood  03/04/18 0610          Components       Value Reference Range Flag Lab   Sodium 137 133 - 144 mmol/L  FrStHsLb   Potassium 3.3 3.4 - 5.3 mmol/L L FrStHsLb   Chloride 98 94 - 109 mmol/L  FrStHsLb   Carbon Dioxide 32 20 - 32 mmol/L  FrStHsLb   Anion Gap 7 3 - 14 mmol/L  FrStHsLb   Glucose 111 70 - 99 mg/dL H FrStHsLb   Urea Nitrogen 22 7 - 30 mg/dL  FrStHsLb   Creatinine 1.04 0.52 - 1.04 mg/dL  FrStHsLb   GFR Estimate 53 >60 mL/min/1.7m2 L FrStHsLb   Comment:  Non  GFR Calc   GFR Estimate If Black 64 >60 mL/min/1.7m2  FrStHsLb   Comment:  African American GFR Calc   Calcium 8.2 8.5 - 10.1 mg/dL L FrStHsLb            Magnesium [[id number]] (Abnormal)  Resulted: 03/04/18 0638, Result status: Final result    Ordering provider: Franky Dupree MD  03/04/18 0610 Resulting lab: [facility]    Specimen Information    Type Source Collected On     03/04/18 0610          Components       Value Reference Range Flag Lab   Magnesium 2.4 1.6 - 2.3 mg/dL H FrStHsLb            CBC with platelets [[id number]] (Abnormal)  Resulted: 03/04/18 0624, Result status: Final result    Ordering provider: Franky Dupree MD  03/04/18 0000 Resulting lab: [facility]    Specimen Information    Type Source Collected On   Blood  03/04/18 0610          Components       Value Reference Range Flag Lab   WBC 20.3 4.0 - 11.0 10e9/L H FrStHsLb   RBC Count 2.83 3.8 - 5.2 10e12/L L FrStHsLb   Hemoglobin 7.3 11.7 - 15.7 g/dL L  FrStHsLb   Hematocrit 23.2 35.0 - 47.0 % L FrStHsLb   MCV 82 78 - 100 fl  FrStHsLb   MCH 25.8 26.5 - 33.0 pg L FrStHsLb   MCHC 31.5 31.5 - 36.5 g/dL  FrStHsLb   RDW 17.5 10.0 - 15.0 % H FrStHsLb   Platelet Count 480 150 - 450 10e9/L H FrStHsLb            Glucose by meter [[id number]] (Abnormal)  Resulted: 03/04/18 0610, Result status: Final result    Ordering provider: Franky Dupree MD  03/04/18 0558 Resulting lab: POINT OF CARE TEST, GLUCOSE    Specimen Information    Type Source Collected On     03/04/18 0558          Components       Value Reference Range Flag Lab   Glucose 117 70 - 99 mg/dL H 170            Glucose by meter [[id number]] (Abnormal)  Resulted: 03/04/18 0354, Result status: Final result    Ordering provider: Franky Dupree MD  03/04/18 0342 Resulting lab: POINT OF CARE TEST, GLUCOSE    Specimen Information    Type Source Collected On     03/04/18 0342          Components       Value Reference Range Flag Lab   Glucose 216 70 - 99 mg/dL H 170            Gram stain [[id number]]  Resulted: 03/03/18 2222, Result status: Final result    Ordering provider: Franky Dupree MD  03/03/18 0028 Resulting lab: [facility]    Specimen Information    Type Source Collected On   Sputum  03/03/18 1130          Components       Value Reference Range Flag Lab   Specimen Description Sputum      Special Requests Screen   75   Gram Stain <10 Squamous epithelial cells/low power field   226   Gram Stain >25 PMNs/low power field   226   Gram Stain --   226   Result:         Moderate  Mixed gram positive and gram negative bacteria present.              Glucose by meter [[id number]] (Abnormal)  Resulted: 03/03/18 1300, Result status: Final result    Ordering provider: Franky Dupree MD  03/03/18 1249 Resulting lab: POINT OF CARE TEST, GLUCOSE    Specimen Information    Type Source Collected On     03/03/18 1249          Components       Value Reference Range Flag Lab   Glucose 149 70 - 99 mg/dL  H 170            Methicillin Resist/Sens S. aureus PCR [[id number]]  Resulted: 03/03/18 1244, Result status: Final result    Ordering provider: Franky Dupree MD  03/03/18 0028 Resulting lab: [facility]    Specimen Information    Type Source Collected On   Nares  03/03/18 0150          Components       Value Reference Range Flag Lab   Specimen Description Nares   FrStHsLb   Methicillin Resist/Sens S. aureus PCR Negative NEG^Negative  75   Comment:         MRSA Negative: SA Negative  MRSA and Staphylococcus aureus target DNA not   detected, presumed negative for MRSA and SA colonization or the number of   bacteria present may be below the limit of detection for the assay. FDA   approved assay performed using [employer] GeneXpert(R) real-time PCR.              Magnesium [[id number]] (Abnormal)  Resulted: 03/03/18 1237, Result status: Final result    Ordering provider: Franky Dupree MD  03/03/18 0425 Resulting lab: [facility]    Specimen Information    Type Source Collected On     03/03/18 0425          Components       Value Reference Range Flag Lab   Magnesium 1.3 1.6 - 2.3 mg/dL L FrStHsLb            Phosphorus [[id number]]  Resulted: 03/03/18 1237, Result status: Final result    Ordering provider: Franky Dupree MD  03/03/18 0425 Resulting lab: [facility]    Specimen Information    Type Source Collected On     03/03/18 0425          Components       Value Reference Range Flag Lab   Phosphorus 3.5 2.5 - 4.5 mg/dL  FrStHsLb            Glucose by meter [[id number]] (Abnormal)  Resulted: 03/03/18 0733, Result status: Final result    Ordering provider: Franky Dupree MD  03/03/18 0721 Resulting lab: POINT OF CARE TEST, GLUCOSE    Specimen Information    Type Source Collected On     03/03/18 0721          Components       Value Reference Range Flag Lab   Glucose 137 70 - 99 mg/dL H 170            Basic metabolic panel [[id number]]  (Abnormal)  Resulted: 03/03/18 0458, Result status: Final result    Ordering provider: Franky Dupree MD  03/03/18 0028 Resulting lab: [facility]    Specimen Information    Type Source Collected On   Blood  03/03/18 0425          Components       Value Reference Range Flag Lab   Sodium 128 133 - 144 mmol/L L FrStHsLb   Potassium 3.7 3.4 - 5.3 mmol/L  FrStHsLb   Chloride 88 94 - 109 mmol/L L FrStHsLb   Carbon Dioxide 34 20 - 32 mmol/L H FrStHsLb   Anion Gap 6 3 - 14 mmol/L  FrStHsLb   Glucose 143 70 - 99 mg/dL H FrStHsLb   Urea Nitrogen 15 7 - 30 mg/dL  FrStHsLb   Creatinine 0.51 0.52 - 1.04 mg/dL L FrStHsLb   GFR Estimate >90 >60 mL/min/1.7m2  FrStHsLb   Comment:  Non  GFR Calc   GFR Estimate If Black >90 >60 mL/min/1.7m2  FrStHsLb   Comment:  African American GFR Calc   Calcium 8.6 8.5 - 10.1 mg/dL  FrStHsLb            CBC with platelets [[id number]] (Abnormal)  Resulted: 03/03/18 0444, Result status: Final result    Ordering provider: Franky Dupree MD  03/03/18 0028 Resulting lab: [facility]    Specimen Information    Type Source Collected On   Blood  03/03/18 0425          Components       Value Reference Range Flag Lab   WBC 16.6 4.0 - 11.0 10e9/L H FrStHsLb   RBC Count 2.91 3.8 - 5.2 10e12/L L FrStHsLb   Hemoglobin 7.4 11.7 - 15.7 g/dL L FrStHsLb   Hematocrit 23.5 35.0 - 47.0 % L FrStHsLb   MCV 81 78 - 100 fl  FrStHsLb   MCH 25.4 26.5 - 33.0 pg L FrStHsLb   MCHC 31.5 31.5 - 36.5 g/dL  FrStHsLb   RDW 17.0 10.0 - 15.0 % H FrStHsLb   Platelet Count 446 150 - 450 10e9/L  FrStHsLb            Glucose by meter [[id number]] (Abnormal)  Resulted: 03/03/18 0007, Result status: Final result    Ordering provider: Franky Dupree MD  03/02/18 8329 Resulting lab: POINT OF CARE TEST, GLUCOSE    Specimen Information    Type Source Collected On     03/02/18 1894          Components       Value Reference Range Flag Lab   Glucose 165 70 - 99 mg/dL H 170           "  Testing Performed By     Lab - Abbreviation Name Director Address Valid Date Range    14 - FrStHsLb [facility] Unknown [address] [place] [address] 05/08/15 1057 - Present    51 - Unknown [facility] Unknown [address] [facility] [address] 12/31/14 1010 - Present    75 - Unknown [facility] Unknown [address] [facility] [address] 01/15/15 1019 - Present    170 - Unknown POINT OF CARE TEST, GLUCOSE Unknown Unknown 10/31/11 1114 - Present    225 - Unknown [facility] Unknown [address] [facility] [address] 12/19/14 0954 - Present    226 - Unknown MICRO RAPID TESTING LAB Unknown [address] [facility] [address] 12/19/14 0955 - Present            Unresulted Labs (24h ago through future)    Start       Ordered    03/05/18 0600  Magnesium  EVERY MONDAY,   Routine     Comments:  Every Monday while on enteral tube feeding.    03/03/18 1220    03/05/18 0600  Phosphorus  EVERY MONDAY,   Routine     Comments:  Every Monday while on enteral tube feeding.    03/03/18 1220    03/05/18 0600  Prealbumin  EVERY MONDAY,   Routine     Comments:  Every Monday while on enteral tube feeding.    03/03/18 1220    03/04/18 0600  Platelet count  EVERY THREE DAYS,   Routine     Comments:  If no result is listed, this lab has not been done the past 365 days. LATEST LAB RESULT: Platelet Count (10e9/L)       Date                     Value                 03/03/2018               446              ----------    03/03/18 0825    03/03/18 0600  Basic metabolic panel  DAILY,   Routine      03/03/18 0028    03/03/18 0600  CBC with platelets  DAILY,   Routine     Comments:  Last Lab Result: Hemoglobin (g/dL)       Date                     Value                 03/02/2018               8.1 (L)          ----------    03/03/18 0028    Unscheduled  Potassium  (Potassium Replacement - \"Standard\" - For K levels less " "than 3.4 mmol/L - UU,UR,UA,RH,SH,PH,WY )  CONDITIONAL (SPECIFY),   Routine     Comments:  Obtain Potassium Level for these conditions:  *IF no potassium result within 24 hours before initiation of order set, draw potassium level with next lab collect.    *2 HOURS AFTER last IV potassium replacement dose and 4 hours after an oral replacement dose.  *Next morning after potassium dose.     Repeat Potassium Replacement if necessary.    03/03/18 0028    Unscheduled  Magnesium  (Magnesium Replacement -  Adult - \"Standard\" - Replacement for all levels less than 1.6 mg/dL )  CONDITIONAL (SPECIFY),   Routine     Comments:  Obtain Magnesium Level for these conditions:  *IF no magnesium result within 24 hrs before initiation of order set, draw magnesium level with next lab collect.    *2 HOURS AFTER last magnesium replacement dose when magnesium replacement given for level less than 1.6   *Next morning after magnesium dose.     Repeat Magnesium Replacement if necessary.    03/03/18 0028         Imaging Results - 3 Days      CT Chest w/o Contrast [[id number]]  Resulted: 03/04/18 1549, Result status: Final result    Ordering provider: Snatos Downing MD  03/04/18 1408 Resulted by: José Franco MD    Performed: 03/04/18 1446 - 03/04/18 1450 Resulting lab: RADIOLOGY RESULTS    Narrative:       CT CHEST WITHOUT CONTRAST 3/4/2018 2:50 PM     HISTORY: Presenting with pneumonia, mucous plugging and history of  lung cancer; increasing dyspnea with progressing leukocytosis and  procalcitonin.     COMPARISON: CT chest 3/2/2018.    TECHNIQUE: Axial images from the thoracic inlet to the lung bases are  performed with additional coronal reformatted images. No contrast is  utilized.  Radiation dose for this scan was reduced using automated  exposure control, adjustment of the mA and/or kV according to patient  size, or iterative reconstruction technique.    FINDINGS:     Chest: Posterior medial left lower lobe consolidation is noted " with  some degree of volume loss in the left lower lobe. Right lower lobe  pneumatocele is present. Bilateral bronchial stents are present. The  right lower lobe bronchial stent and right upper lobe bronchial stent  are occluded on series 3, image 27 and series 3, image 30 possibly due  to clot or hemorrhage. Infiltrative mass remains in the differential.  Right lower lobe bronchial stent was patent on prior recent CT but the  upper lobe stent was occluded. Findings would suggest progression of  underlying process possibly hemorrhage. Right suprahilar mass is  unchanged. Slight interval enlargement of the right pleural effusion  is noted. Trace amount left pleural fluid is also present which is new  since prior study. Increasing volume loss right lower lobe is likely  related to the occluded bronchiolar stent. Heart is normal in size.   No pericardial fluid is evident. Limited images upper abdomen are  grossly unremarkable. Degenerative spine changes are noted.      Impression:       IMPRESSION:  1. Interval occlusion of the right lower lobe bronchiolar stent.  Persistent occlusion right upper lobe bronchiolar stent. This could be  due to hemorrhage or debris. Follow-up bronchoscopy for further  evaluation. Left bronchiolar stent remains patent. Left lower lobe  infiltrate appears more consolidative now consistent with underlying  pneumonia.  2. Pleural effusions have increased slightly since the recent chest  CT. Volume loss right hemithorax is likely due to the occluded  bronchiolar stents.      KIRAN RUBIO MD      Chest CT, IV contrast only - PE protocol [[id number]]  Resulted: 03/02/18 2219, Result status: Final result    Ordering provider: Wolfgang Ramos MD  03/02/18 2009 Resulted by: Alan Le MD    Performed: 03/02/18 2041 - 03/02/18 2055 Resulting lab: RADIOLOGY RESULTS    Narrative:       CT CHEST PULMONARY EMBOLISM WITH CONTRAST   3/2/2018 8:55 PM     HISTORY: Lung cancer, stents, prior mucous  "plugging and rigid  bronchoscopy at [facility], \"can't breath\".      TECHNIQUE: 58 mL Isovue-370. Radiation dose for this scan was reduced  using automated exposure control, adjustment of the mA and/or kV  according to patient size, or iterative reconstruction technique.    COMPARISON: None.    FINDINGS: Large right hilar mass with stent in the right mainstem  bronchus. Stent in right upper lobe bronchus appears occluded.  Extensive right paratracheal and subcarinal adenopathy. Patchy  infiltrates or early small metastatic nodules in the right lower lobe  and mildly so on the left lower lobe. Small right pleural effusion.  Scans through the upper abdomen are unremarkable.    No left-sided pulmonary emboli. There are patent right upper lobe  pulmonary vessels without evidence of upper lobe pulmonary embolus.  The pulmonary artery on the right is occluded proximal to the branches  to the right middle lobe and right lower lobe likely as a result of  the large right hilar mass. No definite filling defects just absence  of contrast of the right middle and right lower lobe pulmonary  arteries.      Impression:       IMPRESSION:  1. The right middle and lower lobe pulmonary arteries appear occluded  by a large right hilar mass. There are no specific filling defects,  but apparent occlusion just prior to the branching of the right middle  and right lower lobe pulmonary artery branches. It would be difficult  to exclude pulmonary emboli here, but it has the appearance of  occlusion due to mass effect.  2. No left-sided pulmonary emboli.  3. Very prominent right paratracheal and subcarinal adenopathy.  4. Patchy infiltrates or early small metastatic nodules in the right  lower lobe and mildly so on the left lower lobe.  5. Small right pleural effusion.   6. Right mainstem bronchus stent appears patent extending toward the  right lower lobe. The right upper lobe bronchus stent appears  occluded.    DELROY PEREZ MD      Testing " Performed By     Lab - Abbreviation Name Director Address Valid Date Range    104 - Rad Rslts RADIOLOGY RESULTS Unknown Unknown 02/16/05 1553 - Present            Encounter-Level Documents:     There are no encounter-level documents.      Order-Level Documents:     There are no order-level documents.

## 2018-03-02 NOTE — IP AVS SNAPSHOT
` ` Patient Information     Patient Name Sex     Haley Noguera ([id number]) Female 1950       Room Bed    6608 6608-01      Patient Demographics     Address Phone    [address] [facility] [address] [phone] (Home)      Patient Ethnicity & Race     Ethnic Group Patient Race    American White      Emergency Contact(s)     Name Relation Home Work Mobile    AMADOU NOGUERA Spouse [phone]      Documents on File        Status Date Received Description       Documents for the Patient    Affiliate Privacy placeholder   phase3    Consent for EHR Access Received 18     External Medication Information Consent       Patient ID Received 18     [facility] Specified Other       Consent for Services/Privacy Notice - Hospital/Clinic Received 18     Privacy Notice - [place] Received 18     Care Everywhere Prospective Auth Received 18     Insurance Card Received 18     Insurance Card Received 18     Insurance Card Received (Deleted) 18     CE Persistent Point of Care Auth Received 18 CE Persistent Point of Care Authorization       Documents for the Encounter    CMS IM for Patient Signature Received 18     EMS/Ambulance Record  18 [facility]      Admission Information     Attending Provider Admitting Provider Admission Type Admission Date/Time    Franky Dupree MD Comnick, Mark Brian, MD Emergency 18    Discharge Date Hospital Service Auth/Cert Status Service Area     Hospitalist [facility]    Unit Room/Bed Admission Status       [facility] MED SPEC UNIT 6608/6608-01 Admission (Confirmed)       Admission     Complaint    HCAP (healthcare-associated pneumonia)      Hospital Account     Name Acct ID Class Status Primary Coverage    Haley Noguera [id number] Inpatient Open MEDICARE - MEDICARE FOR HB SUPPLEMENT            Guarantor Account (for Hospital Account #[id number])      Name Relation to Pt Service Area Active? Acct Type    Haley Mckeon Self FCS Yes Personal/Family    Address Phone          [address] [place] [address] [phone](H)              Coverage Information (for Hospital Account #[id number])     1. MEDICARE/MEDICARE FOR HB SUPPLEMENT     F/O Payor/Plan Precert #    MEDICARE/MEDICARE FOR HB SUPPLEMENT     Subscriber Subscriber #    Haley Mckeon [id number]    Address Phone    [employer] CLAIM[employer]  [address]  [place] [address] [phone]          2. HEALTHPARTNERS/HEALTH[employer]ERS FREEDOM PLAN     F/O Payor/Plan Precert #    HE[employer]NERS/HEALTH[employer]ERS FREEDOM PLAN     Subscriber Subscriber #    Haley Mckeon [id number]    Address Phone    [address]  [place] [address] [phone]

## 2018-03-02 NOTE — IP AVS SNAPSHOT
"[place] [address] [facility] UNIT: [phone]                                              INTERAGENCY TRANSFER FORM - PHYSICIAN ORDERS   3/2/2018                    Hospital Admission Date: 3/2/2018  DAVE NOGUERA   : 1950  Sex: Female        Attending Provider: Franky Dupree MD     Allergies:  Aloe    Infection:  None   Service:  HOSPITALIST    Ht:  1.676 m (5' 6\")   Wt:  65.4 kg (144 lb 2.9 oz)   Admission Wt:  64 kg (141 lb)    BMI:  23.27 kg/m 2   BSA:  1.75 m 2            Patient PCP Information     Provider PCP Type    Rhys Terry DO General      ED Clinical Impression     Diagnosis Description Comment Added By Time Added    Pneumonia of both lower lobes due to infectious organism [J18.9] Pneumonia of both lower lobes due to infectious organism [J18.9]  Wolfgang Ramos MD 3/2/2018 10:32 PM    Malignant neoplasm of right lung, unspecified part of lung (H) [C34.91] Malignant neoplasm of right lung, unspecified part of lung (H) [C34.91]  Wolfgang Ramos MD 3/2/2018 10:32 PM      Hospital Problems as of 3/6/2018              Priority Class Noted POA    HCAP (healthcare-associated pneumonia) Medium  3/3/2018 Yes      Non-Hospital Problems as of 3/6/2018     None      Code Status History     Date Active Date Inactive Code Status Order ID Comments User Context    3/6/2018  7:46 AM  DNR [id number]  Santos Downing MD Outpatient    3/3/2018 12:28 AM 3/6/2018  7:46 AM DNR [id number]  Franky Dupree MD Inpatient         Medication Review      START taking        Dose / Directions Comments    meropenem 1 G vial   Commonly known as:  MERREM   Indication:  Healthcare-Associated Pneumonia   Used for:  Pneumonia of both lower lobes due to infectious organism        Dose:  1 g   Inject 1,000 mg (1 g) into the vein every 12 hours   Quantity:  30 each   Refills:  0          CONTINUE these medications which have NOT CHANGED        Dose / Directions Comments    acetaminophen 32 mg/mL " solution   Commonly known as:  TYLENOL        Dose:  960 mg   Take 960 mg by mouth every 6 hours as needed for fever or mild pain   Refills:  0        albuterol 108 (90 BASE) MCG/ACT Inhaler   Commonly known as:  PROAIR HFA/PROVENTIL HFA/VENTOLIN HFA        Dose:  1-2 puff   Inhale 1-2 puffs into the lungs every 4 hours as needed for shortness of breath / dyspnea or wheezing   Refills:  0        alum & mag hydroxide-simethicone 200-200-20 MG/5ML Susp suspension   Commonly known as:  MYLANTA/MAALOX        Dose:  30 mL   30 mLs by Gastric Tube route every 4 hours as needed for indigestion   Refills:  0        DILTIAZEM HCL PO        Dose:  90 mg   90 mg by Gastric Tube route every 6 hours   Refills:  0        ENOXAPARIN SODIUM SC        Dose:  90 mg   Inject 90 mg Subcutaneous daily   Refills:  0        FIRST-MOUTHWASH BLM MT        Dose:  15 mL   Take 15 mLs by mouth 4 times daily 268--40/30 mL   Refills:  0        ipratropium 0.02 % neb solution   Commonly known as:  ATROVENT        Dose:  0.5 mg   Take 0.5 mg by nebulization 4 times daily   Refills:  0        LANsoprazole 3 mg/mL Susp   Commonly known as:  PREVACID        Dose:  30 mg   30 mg by Gastric Tube route every morning (before breakfast)   Refills:  0        METOPROLOL TARTRATE PO        Dose:  25 mg   Take 25 mg by mouth every 8 hours   Refills:  0        nystatin 574187 UNIT/ML suspension   Commonly known as:  MYCOSTATIN        Dose:  586271 Units   Take 500,000 Units by mouth 4 times daily   Refills:  0        oxyCODONE 5 MG/5ML solution   Commonly known as:  ROXICODONE        Dose:  5 mg   Take 5 mg by mouth every 3 hours as needed for moderate to severe pain   Refills:  0        PAROXETINE HCL PO        Dose:  20 mg   Take 20 mg by mouth daily   Refills:  0        senna-docusate 8.6-50 MG per tablet   Commonly known as:  SENOKOT-S;PERICOLACE        Dose:  1 tablet   Take 1 tablet by mouth daily as needed for constipation   Refills:  0         sodium chloride 0.9 % neb solution        Dose:  3 mL   Take 3 mLs by nebulization 4 times daily   Refills:  0        umeclidinium-vilanterol 62.5-25 MCG/INH oral inhaler   Commonly known as:  ANORO ELLIPTA        Dose:  1 puff   Inhale 1 puff into the lungs daily   Refills:  0                Summary of Visit     Reason for your hospital stay       You were admitted for pneumonia with mucous plugging.             After Care     Activity - Up with nursing assistance           Advance Diet as Tolerated       Follow this diet upon discharge:  CURRENTLY NPO AND HOLDING TUBE FEEDS PRIOR TO BRONCHOSCOPY      Prior tube feeding: Adult Formula Bolus Feeding: TID Isosource 1.5; Route: Gastrostomy; 4; Can(s); Medication - Tube Feeding Flush Frequency: 30 mL water before and after tube feeds; 1.5 cans q  AM, 1.5 cans q Noon, 1 can q Evening       Alvarado catheter       To straight gravity drainage. Change catheter every 2 weeks and PRN for leaking or decreased uring output with signs of bladder distention. DO NOT change catheter without a specific MD order IF diagnosis of benign prostatic hypertrophy (BPH), neurogenic bladder, or other urological conditions       Wound care       Site:   PEG tube  Instructions:  Routine cares.             Procedures     Oxygen - Nasal cannula       6 Lpm by nasal cannula to keep O2 sats 92% or greater.             Follow-Up Appointment Instructions     Future Labs/Procedures    Follow Up and recommended labs and tests     Comments:    Follow up with providers at [facility] for ongoing treatment.      Follow-Up Appointment Instructions     Follow Up and recommended labs and tests       Follow up with providers at [facility] for ongoing treatment.             Statement of Approval     Ordered          03/06/18 0746  I have reviewed and agree with all the recommendations and orders detailed in this document.  EFFECTIVE NOW     Approved and electronically signed by:  Santos Downing MD

## 2018-03-02 NOTE — Clinical Note
Admitting Physician: JUAN DAVID AGEE [[id number]]   Bed Type: Adult Med/Surg [46]   Special needs: BIPAP [16]   Special needs: Cancer [7]

## 2018-03-02 NOTE — IP AVS SNAPSHOT
"` `           [place] [address] [facility] UNIT: [phone]                                              INTERAGENCY TRANSFER FORM - NURSING   3/2/2018                    Hospital Admission Date: 3/2/2018  DAVE NOGUERA   : 1950  Sex: Female        Attending Provider: Franky Dupree MD     Allergies:  Aloe    Infection:  None   Service:  HOSPITALIST    Ht:  1.676 m (5' 6\")   Wt:  65.4 kg (144 lb 2.9 oz)   Admission Wt:  64 kg (141 lb)    BMI:  23.27 kg/m 2   BSA:  1.75 m 2            Patient PCP Information     Provider PCP Type    Rhys Terry DO General      Current Code Status     Date Active Code Status Order ID Comments User Context       Prior      Code Status History     Date Active Date Inactive Code Status Order ID Comments User Context    3/6/2018  7:46 AM  DNR [id number]  Santos Downing MD Outpatient    3/3/2018 12:28 AM 3/6/2018  7:46 AM DNR [id number]  Franky Dupree MD Inpatient      Advance Directives        Scanned docmt in ACP Activity?           No scanned doc        Hospital Problems as of 3/6/2018              Priority Class Noted POA    HCAP (healthcare-associated pneumonia) Medium  3/3/2018 Yes      Non-Hospital Problems as of 3/6/2018     None      Immunizations     None         END      ASSESSMENT     Discharge Profile Flowsheet     EXPECTED DISCHARGE     Inspection of bony prominences  Full 18 0407    Expected Discharge Date  18 (transfer to [place] per Hospitalist?) 18 1549   Inspection under devices  Full 18 0407    GASTROINTESTINAL (ADULT,PEDIATRIC,OB)     Skin WDL  ex 18 0407    GI WDL  WDL 18 0407   Skin Color/Characteristics  pale;redness blanchable 18 0407    Abdominal Appearance  other (see comments) (G-tube) 18 1754   Skin Temperature  cool 18 0407    All Quadrants Bowel Sounds  audible and active in all quadrants 18 1754   Skin Moisture  dry 18 0407    Last Bowel Movement  18 " "03/06/18 0407   Skin Elasticity  slow return to original state 03/05/18 1754    Passing flatus  yes 03/06/18 0407   Skin Integrity  bruise(s);drain/device(s) 03/06/18 0407    COMMUNICATION ASSESSMENT     SAFETY      Patient's communication style  spoken language (English or Bilingual) 03/03/18 0112   Safety WDL  WDL 03/06/18 0407    SKIN     All Alarms  alarm(s) activated and audible 03/06/18 0407                 Assessment WDL (Within Defined Limits) Definitions           Safety WDL     Effective: 09/28/15    Row Information: <b>WDL Definition:</b> Bed in low position, wheels locked; call light in reach; upper side rails up x 2; ID band on<br> <font color=\"gray\"><i>Item=AS safety wdl>>List=AS safety wdl>>Version=F14</i></font>      Skin WDL     Effective: 09/28/15    Row Information: <b>WDL Definition:</b> Warm; dry; intact; elastic; without discoloration; pressure points without redness<br> <font color=\"gray\"><i>Item=AS skin wdl>>List=AS skin wdl>>Version=F14</i></font>      Vitals     Vital Signs Flowsheet     VITAL SIGNS     Side Effects Monitoring: Respiratory Quality  R 03/06/18 0431    Temp  97.4  F (36.3  C) 03/06/18 0751   Side Effects Monitoring: Respiratory Depth  N 03/06/18 0431    Temp src  Oral 03/06/18 0751   Side Effects Monitoring: Sedation Level  S 03/06/18 0431    Resp  20 03/06/18 0751   HEIGHT AND WEIGHT      Pulse  100 03/06/18 0751   Height  1.676 m (5' 6\") 03/03/18 0057    Heart Rate  92 03/06/18 0219   Weight  65.4 kg (144 lb 2.9 oz) 03/06/18 0630    Pulse/Heart Rate Source  Monitor 03/06/18 0751   Weight Method  Bed scale 03/06/18 0630    BP  134/80 03/06/18 0751   Bed Scale  Standard (fitted sheet, draw sheet/ pad, cover/flat sheet, blanket, two pillows) 03/03/18 0057    BP Location  Left arm 03/06/18 0751   BSA (Calculated - sq m)  1.71 03/03/18 0057    OXYGEN THERAPY     BMI (Calculated)  22.36 03/03/18 0057    SpO2  95 % 03/06/18 0751   POSITIONING      O2 Device  Nasal cannula " "03/06/18 0751   Body Position  independently positioning 03/06/18 0630    FiO2 (%)  40 % 03/04/18 1832   Head of Bed (HOB)  HOB at 30-45 degrees 03/06/18 0630    Oxygen Delivery  6 LPM 03/06/18 0751   Positioning/Transfer Devices  pillows;in use 03/06/18 0630    PAIN/COMFORT     DAILY CARE      Patient Currently in Pain  sleeping: patient not able to self report 03/06/18 0431   Activity Management  activity adjusted per tolerance 03/06/18 0630    Preferred Pain Scale  number (Numeric Rating Pain Scale) 03/06/18 0431   Activity Assistance Provided  assistance, 1 person 03/06/18 0630    Patient's Stated Pain Goal  No pain 03/05/18 0056   Assistive Device Utilized  gait belt 03/04/18 1742    0-10 Pain Scale  2 03/06/18 0304   Additional Documentation  Activity Device Assistance (Row) 03/04/18 1612    Pain Location  Abdomen 03/06/18 0431   ECG      Pain Orientation  Left;Lower 03/06/18 0431   ECG Rhythm  Sinus rhythm 03/04/18 0829    Pain Descriptors  Other (comment) (\"Ouch\") 03/05/18 2229   Ectopy  None 03/04/18 0006    Pain Intervention(s)  Medication (See eMAR) 03/06/18 0431   Ectopy Frequency  Occasional 03/03/18 2053    Response to Interventions  Absence of nonverbal indicators of pain 03/06/18 0431   Lead Monitored  Lead II;V 1 03/04/18 0006    ANALGESIA SIDE EFFECTS MONITORING                   Patient Lines/Drains/Airways Status    Active LINES/DRAINS/AIRWAYS     Name: Placement date: Placement time: Site: Days: Last dressing change:    Gastrostomy/Enterostomy Percutaneous endoscopic gastrostomy (PEG) LUQ 20 fr 02/12/18      LUQ   22     Urethral Catheter Coude 03/03/18   0030   Coude   3     Peripheral IV 03/02/18 Right Upper forearm 03/02/18   2056   Upper forearm   3     Peripheral IV 03/02/18 Left Hand 03/02/18   1030   Hand   3     Pressure Injury 03/03/18 Medial;Posterior Sacrum Stage 1 03/03/18   0200    3             Patient Lines/Drains/Airways Status    Active PICC/CVC     None            Intake/Output " Detail Report     Date Intake         Output   Net    Shift P.O. I.V. NG/GT IV Piggyback Enteral Total Urine Emesis/NG output Total       Noc 03/04/18 2300 - 03/05/18 0659 -- 100 210 -- -- 310 1500 -- 1500 -1190    Day 03/05/18 0700 - 03/05/18 1459 -- 100 440 -- 375 915 -- -- -- 915    Cherelle 03/05/18 1500 - 03/05/18 2259 200 120 265 -- 814 1399 425 -- 425 974    Noc 03/05/18 2300 - 03/06/18 0659 -- -- 90 -- -- 90 450 -- 450 -360    Day 03/06/18 0700 - 03/06/18 1459 -- -- -- -- -- -- -- -- -- 0      Last Void/BM       Most Recent Value    Urine Occurrence     Stool Occurrence 1 at 03/05/2018 2245      Case Management/Discharge Planning     Case Management/Discharge Planning Flowsheet     LIVING ENVIRONMENT     QUESTION TO PATIENT:  Has a member of your family or a partner(now or in the past) intimidated, hurt, manipulated, or controlled you in any way?  no 03/03/18 0112    Lives With  significant other 03/03/18 0112   QUESTION TO PATIENT: Do you feel safe going back to the place where you are living?  yes 03/03/18 0112    COPING/STRESS     OBSERVATION: Is there reason to believe there has been maltreatment of a vulnerable adult (ie. Physical/Sexual/Emotional abuse, self neglect, lack of adequate food, shelter, medical care, or financial exploitation)?  no 03/03/18 0112    Major Change/Loss/Stressor  none 03/03/18 0112   OTHER      EXPECTED DISCHARGE     Are you depressed or being treated for depression?  No 03/03/18 0112    Expected Discharge Date  03/06/18 (transfer to [place] per Hospitalist?) 03/05/18 1549   HOMICIDE RISK      ABUSE RISK SCREEN     Feels Like Hurting Others  no 03/02/18 1946

## 2018-03-02 NOTE — IP AVS SNAPSHOT
` `     [place] [address] [facility]: [phone]            Medication Administration Report for Haley Mckeon as of 03/06/18 1041   Legend:    Given Hold Not Given Due Canceled Entry Other Actions    Time Time (Time) Time  Time-Action       Inactive    Active    Linked        Medications 02/28/18 03/01/18 03/02/18 03/03/18 03/04/18 03/05/18 03/06/18    acetaminophen (TYLENOL) solution 975 mg  Dose: 975 mg  Freq: EVERY 6 HOURS PRN Route: PER G TUBE  PRN Reasons: fever,mild pain  Start: 03/03/18 0027   Admin Instructions: Maximum acetaminophen dose from all sources= 75 mg/kg/day not to exceed 4 grams/day.         1234 (975 mg)-Given        0041 (975 mg)-Given       1336 (975 mg)-Given       1942 (975 mg)-Given            acetylcysteine (MUCOMYST) 20 % nebulizer solution 2 mL  Dose: 2 mL  Freq: EVERY 4 HOURS Route: NEBULIZATION  Start: 03/05/18 1500   Admin Instructions: For 4 mL of 10%  Nebulization tubing with a FILTER is recommended with acetylcysteine nebs.          1505 (2 mL)-Given       1943 (2 mL)-Given       2326 (2 mL)-Given        0252 (2 mL)-Given       0753 (2 mL)-Given       [ ] 1100       [ ] 1500       [ ] 1900       [ ] 2300           albuterol neb solution 2.5 mg  Dose: 2.5 mg  Freq: EVERY 4 HOURS Route: NEBULIZATION  Start: 03/05/18 1100   Admin Instructions: Formulary alternate for  duonebs since using anora ellipta          1136 (2.5 mg)-Given       1505 (2.5 mg)-Given       1943 (2.5 mg)-Given       2326 (2.5 mg)-Given        0252 (2.5 mg)-Given       0753 (2.5 mg)-Given       [ ] 1100       [ ] 1500       [ ] 1900       [ ] 2300           albuterol neb solution 2.5 mg  Dose: 2.5 mg  Freq: EVERY 2 HOURS PRN Route: NEBULIZATION  PRN Reason: other  PRN Comment: dyspnea  Start: 03/04/18 1606        1625 (2.5 mg)-Given             alum & mag hydroxide-simethicone (MYLANTA ES/MAALOX  ES) suspension 15 mL  Dose: 15 mL  Freq: EVERY 4 HOURS PRN Route: PO  PRN Reasons:  indigestion,heartburn  Start: 03/05/18 1152   Admin Instructions: Shake well.               artificial saliva (BIOTENE DRY MOUTHWASH) liquid 15 mL  Dose: 15 mL  Freq: 4 TIMES DAILY PRN Route: SWISH & SPIT  PRN Reason: dry mouth  Start: 03/05/18 0837         0912 (15 mL)-Given       1218 (15 mL)-Given       1942 (15 mL)-Given       2359 (15 mL)-Given        0809 (15 mL)-Given           dextromethorphan (DELSYM) liquid 30 mg  Dose: 30 mg  Freq: 2 TIMES DAILY PRN Route: PO  PRN Reason: cough  Start: 03/04/18 1411         (1337)-Not Given            diltiazem (CARDIZEM) tablet 90 mg  Dose: 90 mg  Freq: EVERY 6 HOURS Route: PO  Start: 03/03/18 0200   Admin Instructions: May be crushed and given per gastric tube    Hold for SBP <100        0119 (90 mg)-Given       1140 (90 mg)-Given              2057 (90 mg)-Given        0233 (90 mg)-Given       0813 (90 mg)-Given       1508 (90 mg)-Given       1900 (90 mg)-Given        0105 (90 mg)-Given       0834 (90 mg)-Given       1340 (90 mg)-Given       1942 (90 mg)-Given        0220 (90 mg)-Given       0836 (90 mg)-Given       [ ] 1400       [ ] 2000           glucose 40 % gel 15-30 g  Dose: 15-30 g  Freq: EVERY 15 MIN PRN Route: PO  PRN Reason: low blood sugar  Start: 03/04/18 0508   Admin Instructions: Give 15 g for BG 51 to 69 mg/dL IF patient is conscious and able to swallow. Give 30 g for BG less than or equal to 50 mg/dL IF patient is conscious and able to swallow. Do NOT give glucose gel via enteral tube.  IF patient has enteral tube: give apple juice 120 mL (4 oz or 15 g of CHO) via enteral tube for BG 51 to 69 mg/dL.  Give apple juice 240 mL (8 oz or 30 g of CHO) via enteral tube for BG less than or equal to 50 mg/dL.    ~Oral gel is preferable for conscious and able to swallow patient.   ~IF gel unavailable or patient refuses may provide apple juice 120 mL (4 oz or 15 g of CHO). Document juice on I and O flowsheet.              Or  dextrose 50 % injection 25-50 mL  Dose:  25-50 mL  Freq: EVERY 15 MIN PRN Route: IV  PRN Reason: low blood sugar  Start: 03/04/18 0508   Admin Instructions: Use if have IV access, BG less than 70 mg/dL and meet dose criteria below:  Dose if conscious and alert (or disorientated) and NPO = 25 mL  Dose if unconscious / not alert = 50 mL  Vesicant. For ordered doses up to 25 mg, give IV Push undiluted. Give each 5g over 1 minute.              Or  glucagon injection 1 mg  Dose: 1 mg  Freq: EVERY 15 MIN PRN Route: SC  PRN Reason: low blood sugar  PRN Comment: May repeat x 1 only  Start: 03/04/18 0508   Admin Instructions: May give SQ or IM. ONLY use glucagon IF patient has NO IV access AND is UNABLE to swallow AND blood glucose is LESS than or EQUAL to 50 mg/dL.  Give IV Push over 1 minute. Reconstitute with 1mL sterile water.               insulin aspart (NovoLOG) inj (RAPID ACTING)  Dose: 1-5 Units  Freq: AT BEDTIME Route: SC  Start: 03/04/18 0515   Admin Instructions: MEDIUM INSULIN RESISTANCE DOSING    Do Not give Bedtime Correction Insulin if BG less than  200.   For  - 249 give 1 units.   For  - 299 give 2 units.   For  - 349 give 3 units.   For  -399 give 4 units.   For BG greater than or equal to 400 give 5 units.  Notify provider if glucose greater than or equal to 350 mg/dL after administration of correction dose.  If given at mealtime, must be administered 5 min before meal or immediately after.         (0558)-Not Given       (2250)-Not Given        2127 (1 Units)-Given [C]        [ ] 2200           insulin aspart (NovoLOG) inj (RAPID ACTING)  Dose: 1-7 Units  Freq: 3 TIMES DAILY BEFORE MEALS Route: SC  Start: 03/04/18 0730   Admin Instructions: Correction Scale - MEDIUM INSULIN RESISTANCE DOSING     Do Not give Correction Insulin if Pre-Meal BG less than 140.   For Pre-Meal  - 189 give 1 unit.   For Pre-Meal  - 239 give 2 units.   For Pre-Meal  - 289 give 3 units.   For Pre-Meal  - 339 give 4 units.    For Pre-Meal - 399 give 5 units.   For Pre-Meal -449 give 6 units  For Pre-Meal BG greater than or equal to 450 give 7 units.   To be given with prandial insulin, and based on pre-meal blood glucose.    Notify provider if glucose greater than or equal to 350 mg/dL after administration of correction dose.  If given at mealtime, must be administered 5 min before meal or immediately after.         (0811)-Not Given [C]       1308 (1 Units)-Given [C]       1845 (2 Units)-Given        (0835)-Not Given       1223 (1 Units)-Given [C]       1736 (2 Units)-Given [C]        (0921)-Not Given       [ ] 1200       [ ] 1700           ipratropium - albuterol 0.5 mg/2.5 mg/3 mL (DUONEB) neb solution 3 mL  Dose: 3 mL  Freq: ONCE Route: NEBULIZATION  Start: 03/02/18 2253                     LORazepam (ATIVAN) half-tab 0.25 mg  Dose: 0.25 mg  Freq: EVERY 8 HOURS PRN Route: PO  PRN Reason: other  PRN Comment: dyspnea  Start: 03/04/18 1407              magnesium sulfate 4 g in 100 mL sterile water (premade)  Dose: 4 g  Freq: EVERY 4 HOURS PRN Route: IV  PRN Reason: magnesium supplementation  Start: 03/03/18 0027   Admin Instructions: For serum Mg++ less than 1.6 mg/dL  Give 4 g and recheck magnesium level 2 hours after dose, and next AM.        1638 (4 g)-New Bag              meropenem (MERREM) 1 g vial to attach to  mL bag  Dose: 1 g  Freq: EVERY 12 HOURS Route: IV  Indications of Use: HEALTHCARE-ASSOCIATED PNEUMONIA  Last Dose: 1 g (03/05/18 2127)  Start: 03/05/18 2100   Admin Instructions: Renally dosed for crcl 26-50 ml/min          2127 (1 g)-New Bag        0918 (1 g)-New Bag       [ ] 2100           metoprolol (LOPRESSOR) injection 2.5-5 mg  Dose: 2.5-5 mg  Freq: EVERY 4 HOURS PRN Route: IV  PRN Reason: other  PRN Comment: HR > 120, hold for SBP < 90  Start: 03/03/18 0807   Admin Instructions: For ordered doses up to 15 mg, give IV Push undiluted over 5-10 minutes.        0822 (2.5 mg)-Given       1002 (2.5  mg)-Given [C]              metoprolol tartrate (LOPRESSOR) tablet 25 mg  Dose: 25 mg  Freq: EVERY 8 HOURS Route: PER G TUBE  Start: 03/03/18 0200   Admin Instructions: Hold for SBP <100 or pulse <60        0118 (25 mg)-Given       1139 (25 mg)-Given       1904 (25 mg)-Given        0233 (25 mg)-Given       1001 (25 mg)-Given       1839 (25 mg)-Given        0105 (25 mg)-Given       1006 (25 mg)-Given       1735 (25 mg)-Given        0220 (25 mg)-Given       0919 (25 mg)-Given       [ ] 1800           naloxone (NARCAN) injection 0.1-0.4 mg  Dose: 0.1-0.4 mg  Freq: EVERY 2 MIN PRN Route: IV  PRN Reason: opioid reversal  Start: 03/03/18 0027   Admin Instructions: For respiratory rate LESS than or EQUAL to 8.  Partial reversal dose:  0.1 mg titrated q 2 minutes for Analgesia Side Effects Monitoring Sedation Level of 3 (frequently drowsy, arousable, drifts to sleep during conversation).Full reversal dose:  0.4 mg bolus for Analgesia Side Effects Monitoring Sedation Level of 4 (somnolent, minimal or no response to stimulation).  For ordered doses up to 2mg give IVP. Give each 0.4mg over 15 seconds in emergency situations. For non-emergent situations further dilute in 9mL of NS to facilitate titration of response.               ondansetron (ZOFRAN-ODT) ODT tab 4 mg  Dose: 4 mg  Freq: EVERY 6 HOURS PRN Route: PO  PRN Reasons: nausea,vomiting  Start: 03/03/18 0027   Admin Instructions: This is Step 1 of nausea and vomiting management.  If nausea not resolved in 15 minutes, go to Step 2 prochlorperazine (COMPAZINE). Do not push through foil backing. Peel back foil and gently remove. Place on tongue immediately. Administration with liquid unnecessary  With dry hands, peel back foil backing and gently remove tablet; do not push oral disintegrating tablet through foil backing; administer immediately on tongue and oral disintegrating tablet dissolves in seconds; then swallow with saliva; liquid not required.               Or  ondansetron (ZOFRAN) injection 4 mg  Dose: 4 mg  Freq: EVERY 6 HOURS PRN Route: IV  PRN Reasons: nausea,vomiting  Start: 03/03/18 0027   Admin Instructions: This is Step 1 of nausea and vomiting management.  If nausea not resolved in 15 minutes, go to Step 2 prochlorperazine (COMPAZINE).  Irritant. For ordered doses up to 4 mg, give IV Push undiluted over 2-5 minutes.               oxyCODONE (ROXICODONE) solution 5 mg  Dose: 5 mg  Freq: EVERY 3 HOURS PRN Route: PER G TUBE  PRN Reason: moderate to severe pain  Start: 03/03/18 0027       1243 (5 mg)-Given       1911 (5 mg)-Given       2240 (5 mg)-Given        0234 (5 mg)-Given       1000 (5 mg)-Given       1843 (5 mg)-Given       2302 (5 mg)-Given        0328 (5 mg)-Given       0810 (5 mg)-Given       1131 (5 mg)-Given       2228 (5 mg)-Given        0304 (5 mg)-Given           pantoprazole (PROTONIX) suspension 40 mg  Dose: 40 mg  Freq: EVERY MORNING BEFORE BREAKFAST Route: PER G TUBE  Start: 03/03/18 0730   Admin Instructions: Shake well.        1138 (40 mg)-Given        0654 (40 mg)-Given               0958 (40 mg)-Given        (0922)-Not Given [C]           PARoxetine (PAXIL) tablet 20 mg  Dose: 20 mg  Freq: DAILY Route: PER G TUBE  Start: 03/03/18 0900       1140 (20 mg)-Given        0813 (20 mg)-Given        0834 (20 mg)-Given        0836 (20 mg)-Given           Patient is already receiving anticoagulation with heparin, enoxaparin (LOVENOX), warfarin (COUMADIN)  or other anticoagulant medication  Freq: CONTINUOUS PRN Route: XX  Start: 03/03/18 0027              potassium chloride (KLOR-CON) Packet 20-40 mEq  Dose: 20-40 mEq  Freq: EVERY 2 HOURS PRN Route: ORAL OR FEED  PRN Reason: potassium supplementation  Start: 03/03/18 0027   Admin Instructions: Use if unable to tolerate tablets.  If Serum K+ 3.0-3.3, dose = 60 mEq po total dose (40 mEq x1 followed in 2 hours by 20 mEq x1). Recheck K+ level 4 hours after dose and the next AM.  If Serum K+ 2.5-2.9, dose  = 80 mEq po total dose (40 mEq Q2H x2). Recheck K+ level 4 hours after dose and the next AM.  If Serum K+ less than 2.5, See IV order.  Dissolve packet contents in 4-8 ounces of cold water or juice.         1000 (20 mEq)-Given        1121 (40 mEq)-Given       1339 (20 mEq)-Given            potassium chloride 10 mEq in 100 mL intermittent infusion with 10 mg lidocaine  Dose: 10 mEq  Freq: EVERY 1 HOUR PRN Route: IV  PRN Reason: potassium supplementation  Start: 03/03/18 0027   Admin Instructions: Infuse via PERIPHERAL LINE. Use potassium with lidocaine for pain with peripheral administration.  If Serum K+ 3.0-3.3, dose = 10 mEq/hr x4 doses (40 mEq IV total dose). Recheck K+ level 2 hours after dose and the next AM.  If Serum K+ less than 3.0, dose = 10 mEq/hr x6 doses (60 mEq IV total dose). Recheck K+ level 2 hours after dose and the next AM.               potassium chloride 10 mEq in 100 mL sterile water intermittent infusion (premix)  Dose: 10 mEq  Freq: EVERY 1 HOUR PRN Route: IV  PRN Reason: potassium supplementation  Start: 03/03/18 0027   Admin Instructions: Infuse via PERIPHERAL LINE or CENTRAL LINE. Use for central line replacement if patient weight less than 65 kg, if patient is on TPN with high potassium content or if unit does not stock 20 mEq bags.   If Serum K+ 3.0-3.3, dose = 10 mEq/hr x4 doses (40 mEq IV total dose). Recheck K+ level 2 hours after dose and the next AM.   If Serum K+ less than 3.0, dose = 10 mEq/hr x6 doses (60 mEq IV total dose). Recheck K+ level 2 hours after dose and the next AM.               potassium chloride 20 mEq in 50 mL intermittent infusion  Dose: 20 mEq  Freq: EVERY 1 HOUR PRN Route: IV  PRN Reason: potassium supplementation  Start: 03/03/18 0027   Admin Instructions: Infuse via CENTRAL LINE Only. May need EKG if less than 65 kg or on TPN - Max rate is 0.3 mEq/kg/hr for patients not on EKG monitoring.   If Serum K+ 3.0-3.3, dose = 20 mEq/hr x2 doses (40 mEq IV total dose).  Recheck K+ level 2 hours after dose and the next AM.  If Serum K+ less than 3.0, dose = 20 mEq/hr x3 doses (60 mEq IV total dose). Recheck K+ level 2 hours after dose and the next AM.               potassium chloride SA (K-DUR/KLOR-CON M) CR tablet 20-40 mEq  Dose: 20-40 mEq  Freq: EVERY 2 HOURS PRN Route: PO  PRN Reason: potassium supplementation  Start: 03/03/18 0027   Admin Instructions: Use if able to take PO.   If Serum K+ 3.0-3.3, dose = 60 mEq po total dose (40 mEq x1 followed in 2 hours by 20 mEq x1). Recheck K+ level 4 hours after dose and the next AM.  If Serum K+ 2.5-2.9, dose = 80 mEq po total dose (40 mEq Q2H x2). Recheck K+ level 4 hours after dose and the next AM.  If Serum K+ less than 2.5, See IV order.  DO NOT CRUSH         0700 (40 mEq)-Given             prochlorperazine (COMPAZINE) injection 5 mg  Dose: 5 mg  Freq: EVERY 6 HOURS PRN Route: IV  PRN Reasons: nausea,vomiting  Start: 03/03/18 0027   Admin Instructions: This is Step 2 of nausea and vomiting management. Give if nausea not resolved 15 minutes after giving ondansetron (ZOFRAN). If nausea not resolved in 15 minutes, go to Step 3 metoclopramide (REGLAN), if ordered.  For ordered doses up to 10 mg, give IV Push undiluted. Each 5mg over 1 minute.              Or  prochlorperazine (COMPAZINE) tablet 5 mg  Dose: 5 mg  Freq: EVERY 6 HOURS PRN Route: PO  PRN Reason: vomiting  Start: 03/03/18 0027   Admin Instructions: This is Step 2 of nausea and vomiting management. Give if nausea not resolved 15 minutes after giving ondansetron (ZOFRAN). If nausea not resolved in 15 minutes, go to Step 3 metoclopramide (REGLAN), if ordered.              Or  prochlorperazine (COMPAZINE) Suppository 12.5 mg  Dose: 12.5 mg  Freq: EVERY 12 HOURS PRN Route: RE  PRN Reasons: nausea,vomiting  Start: 03/03/18 0027   Admin Instructions: This is Step 2 of nausea and vomiting management. Give if nausea not resolved 15 minutes after giving ondansetron (ZOFRAN). If nausea  not resolved in 15 minutes, go to Step 3 metoclopramide (REGLAN), if ordered.               sodium chloride (PF) 0.9% PF flush 3 mL  Dose: 3 mL  Freq: EVERY 8 HOURS Route: IK  Start: 03/04/18 1845   Admin Instructions: And Q1H PRN, to lock peripheral IV dormant line.         1836 (3 mL)-Given        (0328)-Not Given [C]       0959 (3 mL)-Given       1738 (3 mL)-Given        (0407)-Not Given       0930 (3 mL)-Given       [ ] 1800           sodium chloride (PF) 0.9% PF flush 3 mL  Dose: 3 mL  Freq: EVERY 1 HOUR PRN Route: IK  PRN Reason: line flush  Start: 03/04/18 1836   Admin Instructions: for peripheral IV flush post IV meds          2130 (3 mL)-Given       2211 (3 mL)-Given            sodium chloride 3 % neb solution 3 mL  Dose: 3 mL  Freq: EVERY 3 HOURS PRN Route: NEBULIZATION  PRN Reason: wheezing  Start: 03/02/18 2257              umeclidinium-vilanterol (ANORO ELLIPTA) 62.5-25 MCG/INH oral inhaler 1 puff  Dose: 1 puff  Freq: DAILY Route: IN  Start: 03/03/18 0900       0828 (1 puff)-Given        (0816)-Not Given       0904 (1 puff)-Given        0848 (1 puff)-Given        (1009)-Not Given          Completed Medications  Medications 02/28/18 03/01/18 03/02/18 03/03/18 03/04/18 03/05/18 03/06/18         Dose: 500 mL  Freq: ONCE Route: IV  Last Dose: Stopped (03/04/18 1550)  Start: 03/04/18 1500   End: 03/04/18 1550        1521 (500 mL)-New Bag       1550-Stopped               Dose: 1,000 mL  Freq: ONCE Route: IV  Last Dose: 1,000 mL (03/04/18 0755)  Start: 03/04/18 0745   End: 03/04/18 1155        0755 (1,000 mL)-New Bag               Dose: 40 mg  Freq: ONCE Route: IV  Start: 03/04/18 1600   End: 03/04/18 1836   Admin Instructions: For ordered doses up to 40 mg, give IV Push undiluted over 1-2 minutes.         1835 (40 mg)-Given               Dose: 40 mg  Freq: ONCE Route: PO  Start: 03/06/18 1000   End: 03/06/18 1008   Admin Instructions: DO NOT CRUSH.           1008 (40 mg)-Given          Discontinued  Medications  Medications 02/28/18 03/01/18 03/02/18 03/03/18 03/04/18 03/05/18 03/06/18         Dose: 20 mL  Freq: EVERY 4 HOURS PRN Route: PER G TUBE  PRN Reason: indigestion  Start: 03/03/18 0027   End: 03/05/18 1219   Admin Instructions: Shake well.          1209 (20 mL)-Given       1219-Med Discontinued          Dose: 30 mg  Freq: EVERY 12 HOURS SCHEDULED Route: PO  Start: 03/04/18 2000   End: 03/04/18 1411        1411-Med Discontinued           Dose: 90 mg  Freq: DAILY Route: SC  Start: 03/03/18 0900   End: 03/06/18 0719       1522 (90 mg)-Given [C]        1833 (90 mg)-Given        1734 (90 mg)-Given        0719-Med Discontinued         Dose: 15 mL  Freq: EVERY 4 HOURS PRN Route: PER G TUBE  PRN Reason: cough  Start: 03/03/18 0027   End: 03/04/18 1408        1408-Med Discontinued           Dose: 3 mL  Freq: EVERY 4 HOURS Route: NEBULIZATION  Start: 03/03/18 0027   End: 03/05/18 0947              0249 (3 mL)-Given       0831 (3 mL)-Given       1057 (3 mL)-Given       1550 (3 mL)-Given       1956 (3 mL)-Given       2358 (3 mL)-Given        (0351)-Not Given       0734 (3 mL)-Given       1036 (3 mL)-Given       1432 (3 mL)-Given       1908 (3 mL)-Given       2324 (3 mL)-Given        0234 (3 mL)-Given       0802 (3 mL)-Given       0947-Med Discontinued          Dose: 15 mL  Freq: 4 TIMES DAILY Route: SWISH & SPIT  Start: 03/03/18 0900   End: 03/04/18 1008       (1156)-Not Given       (1207)-Not Given       (1903)-Not Given       (2117)-Not Given        (0815)-Not Given       1008-Med Discontinued           Dose: 1 g  Freq: EVERY 8 HOURS Route: IV  Indications of Use: HEALTHCARE-ASSOCIATED PNEUMONIA  Last Dose: 1 g (03/05/18 0045)  Start: 03/04/18 1500   End: 03/05/18 0950        1509 (1 g)-New Bag        0045 (1 g)-New Bag       0913 (1 g)-New Bag       0950-Med Discontinued          Dose: 4.5 g  Freq: EVERY 6 HOURS Route: IV  Indications Comment: Hospital acquired pneumonia  Last Dose: 4.5 g (03/04/18 1241)  Start:  03/03/18 0600   End: 03/04/18 1445   Admin Instructions: FIRST DOSE STAT        0655 (4.5 g)-New Bag       1138 (4.5 g)-New Bag       1911 (4.5 g)-New Bag       2339 (4.5 g)-New Bag        0532 (4.5 g)-New Bag       1241 (4.5 g)-New Bag       1445-Med Discontinued           Rate: 100 mL/hr   Freq: CONTINUOUS Route: IV  Start: 03/03/18 0030   End: 03/04/18 1408       0125 (100 mL/hr)-New Bag       0806 ( )-Rate/Dose Verify       1207 ( )-New Bag       1905 ( )-New Bag       2000-Stopped       2100 ( )-Restarted       2341 ( )-New Bag        0800-Stopped       1200 ( )-Restarted       1408-Med Discontinued           Dose: 3 mL  Freq: EVERY 4 HOURS Route: NEBULIZATION  Start: 03/03/18 0300   End: 03/05/18 1154       0250 (3 mL)-Given       0831 (3 mL)-Given       1058 (3 mL)-Given       1551 (3 mL)-Given       1957 (3 mL)-Given       2359 (3 mL)-Given        (0352)-Not Given       0734 (3 mL)-Given       1036 (3 mL)-Given       (1433)-Not Given       1909 (3 mL)-Given       2324 (4 mL)-Given        0234 (4 mL)-Given       0803 ( )-Given       1136 (3 mL)-Given       1154-Med Discontinued          Dose: 1,500 mg  Freq: EVERY 12 HOURS Route: IV  Indications of Use: HEALTHCARE-ASSOCIATED PNEUMONIA  Last Dose: 1,500 mg (03/04/18 0302)  Start: 03/03/18 0400   End: 03/04/18 0734   Admin Instructions: For an adult with peripheral catheter and dose of 2-2.5 g, infuse over 2 hours.  Vesicant.        0340 (1,500 mg)-New Bag       1636 (1,500 mg)-New Bag        0302 (1,500 mg)-New Bag       0734-Med Discontinued      Medications 02/28/18 03/01/18 03/02/18 03/03/18 03/04/18 03/05/18 03/06/18

## 2018-03-02 NOTE — IP AVS SNAPSHOT
` `           [place] [address] [facility]: [phone]                 INTERAGENCY TRANSFER FORM - NOTES (H&P, Discharge Summary, Consults, Procedures, Therapies)   3/2/2018                    Hospital Admission Date: 3/2/2018  DAVE NOGUERA   : 1950  Sex: Female        Patient PCP Information     Provider PCP Type    Rhys Terry DO General         History & Physicals      H&P by Franky Dupree MD at 3/2/2018 11:32 PM     Author:  Franky Dupree MD Service:  Hospitalist Author Type:  Physician    Filed:  3/3/2018 12:21 AM Date of Service:  3/2/2018 11:32 PM Creation Time:  3/2/2018 11:32 PM    Status:  Signed :  Franky Dupree MD (Physician)         History and Physical     Dave Noguera MRN# [id number]   YOB: 1950 Age: 67 year old      Date of Admission:  3/2/2018    Primary care provider: Rhys Terry          Assessment and Plan:   This is a  67 year old female admitted with healthcare associated pneumonia and mucous plugging of right upper lobe bronchial stent.    1.  Healthcare associated pneumonia.  He has been initiated on Zosyn.  Cultures are pending.    2.  Mucus plugging.  CT of the chest demonstrates occlusion of the right upper lobe bronchial stent.  Patient be admitted to the intensive care unit will consult the intensivist for consideration of bronchoscopy.  For now continue duo nebs and 3% saline nebs.    3.  Acute hypoxic restaurant failure.  Likely due to combination of healthcare associated pneumonia and mucous plugging continue supplemental oxygen.  Patient will be admitted to the intensive care unit for close restaurant monitoring patient is okay with intubation but does not want cardiac resuscitation.    4.  Atrial fibrillation.  Patient is rate controlled on metoprolol and diltiazem.  Will continue.  Continue Lovenox for anticoagulation.    5.  Pain control.  Continue oxycodone.  Monitor for respiratory  suppression.    6.  Radiation-induced esophagitis.  Patient status post PEG tube placement.  Continue Isosource 1.5, cans daily.  Consult nutrition.    7.  Non-small cell lung cancer.  Patient is followed at the [facility].  Will defer consulting oncology service for now.         Attestation:  This patient has been seen and evaluated by me,[MC1.1] Franky Dupree MD[MC1.2].           Chief Complaint:   This patient is a 67 year old female who presents with shortness of breath.    History is obtained from the patient and medical records.         History of Present Illness:   This is a 67-year-old female with non-small cell lung carcinoma and mediastinal invasion complete right mainstem bronchus obstruction left mainstem external compression was treated at the [facility].  Patient status post bronchoscopy with debulking and placement of the endobronchial stents on January 5, 2018.  Patient was recently hospitalized at [facility] from February 8-23, 2018 after she was found to have complete opacification of right hemithorax.  She underwent rigid bronchoscopy on February 7 with stent exchange.  Post procedure she was admitted to the intensive care unit due to tachyarrhythmia need for phenylephrine drip.  She underwent PEG tube placement on February 12.  She was treated with 5 fractions of radiation therapy from February 9-14.  Ultimate plan is for 4500 cGy over 15 fractions broken down into 3 courses of 5 treatments  by 2 week break.  She completed 1 cycle of cisplatin etoposide chemotherapy from February 12 of the 14th.  She underwent flexible bronchoscopy for airway inspection and secretion clearance from bilateral mainstems on February 15.  Post procedure was again complicated by atrial fibrillation with rapid ventricular response requiring ICU admission.  She was treated with IV esmolol amiodarone a digoxin load and diltiazem infusions.  She was then transitioned to oral  metoprolol and diltiazem.  Lovenox was initiated for anticoagulation due to atrial fibrillation.  Patient also developed SIADH with discharge sodium of 128 treated with fluid restriction.  Patient states that she initially did well but has had gradually worsening shortness of breath over the last week.   She denies fevers or chills.  Her shortness of breath has dramatically worse today.  She has been unable to tolerate her nebulizers at home due to shortness of breath.  She denies nasal congestion or runny nose.  She denies vomiting or diarrhea.  She denies chest pain pressure palpitations.  She has slight lower extremity edema that is unchanged.  She denies dysuria or hematuria.  He has been tolerating her tube feeds without difficulty.        Past Medical History:[MC1.1]     Past Medical History:   Diagnosis Date     Cancer (H)     lung[MC1.2]   Non-small cell lung cancer with mediastinal invasion, stage III.  Rigid bronchoscopy with tumor debulking and endobronchial stent placement to right mainstem, rhonchus  intermedius, left mainstem.  Atrial fibrillation with rapid ventricular response.  SIADH.  Severe COPD, FEV1 34%, %, DLCO 63% of predicted.  Chronic hypercapnic restaurant failure.  Malnutrition.  Normocytic anemia.  PEG tube placement.  Radiation-induced esophagitis.          Past Surgical History:[MC1.1]   History reviewed. No pertinent surgical history.[MC1.2]          Social History:[MC1.1]     Social History   Substance Use Topics     Smoking status: Former Smoker     Smokeless tobacco: Not on file     Alcohol use No[MC1.2]             Family History:[MC1.1]   No family history on file.[MC1.2]          Immunizations:[MC1.1]     There is no immunization history on file for this patient.[MC1.2]         Allergies:[MC1.1]   No Known Allergies[MC1.2]          Medications:[MC1.1]     (Not in a hospital admission)[MC1.2]          Review of Systems:   The 10 point Review of Systems is negative other  than noted in the HPI           Physical Exam:   Vitals were reviewed[MC1.1]  Temp: 98.2  F (36.8  C) Temp src: Oral BP: 123/60   Heart Rate: 98 Resp: 22 SpO2: 96 % O2 Device: Nasal cannula Oxygen Delivery: 5 LPM[MC1.2]    This is a debilitated appearing chronically ill female with increased work of breathing.  Head: atraumatic normocephalic, sclera noninjected and anicterric, oral mucosa moist without lesions or exudates.  Neck: supple without spinal abnormality  Chest: Rhonchi bilaterally, without wheezes or rales.  Cardiovascular: regular rate and rhythm, no murmurs, gallops, or rubs, BLE 1 + edema  Abdomen: bowel sounds normal, nontender and nondistended, no hepatosplenomegaly or masses.  Musculoskeletal: normal muscle mass and tone  Skin: no rashes  Lymph: no lymphadenopathy.  Neuro: cranial nerves II-XII intact, strength in a extremities normal, reflexes normal, coordination normal.         Data:   All laboratory data reviewed  All cardiac studies reviewed by me.  All imaging studies reviewed by me.[MC1.1]       Revision History        User Key Date/Time User Provider Type Action    > MC1.2 3/3/2018 12:21 AM Franky Dupree MD Physician Sign     MC1.1 3/2/2018 11:32 PM Franky Dupree MD Physician                   Discharge Summaries     No notes of this type exist for this encounter.         Consult Notes      Consults by Cheryl Hanson RD, LD at 3/3/2018 12:43 PM     Author:  Cheryl Hanson RD, LD Service:  Nutrition Author Type:  Registered Dietitian    Filed:  3/3/2018 12:43 PM Date of Service:  3/3/2018 12:43 PM Creation Time:  3/3/2018 12:20 PM    Status:  Signed :  Cheryl Hanson RD, LD (Registered Dietitian)     Consult Orders:    1. Nutrition Services Adult IP Consult [[id number]] ordered by Franky Dupree MD at 03/03/18 0009                CLINICAL NUTRITION SERVICES  -  ASSESSMENT NOTE      Recommendations Ordered by Macie Dietitian (DOROTHY):   Free H20 30 mL  "before and after TID bolus feedings   Future/Additional Recommendations:   Will monitor the need to increase TF volume as appropriate, as current regimen meeting lower end of needs.   Malnutrition:   % Weight Loss:  > 5% in 1 month (severe malnutrition)  % Intake:  </= 50% for >/= 1 month (severe malnutrition)  Subcutaneous Fat Loss:  Orbital region mild depletion and Upper arm region mild depletion  Muscle Loss:  Temporal region mild depletion and Clavicle bone region mild depletion  Fluid Retention:  Mild - Could be partly nutrition related    Malnutrition Diagnosis: Severe malnutrition  In Context of:  Acute illness or injury        REASON FOR ASSESSMENT  Haley Mckeon is a 67 year old female seen by Registered Dietitian for Admission Nutrition Risk Screen - TF or Parenteral Nutrition and Provider Order - Registered Dietitian to Assess and Order TF per Medical Nutrition protocol      NUTRITION HISTORY  - Information obtained from patient and EPIC records.  - Patient is on a diet as tolerated (no restrictions per pt) at home.  - Typical food/fluid intake is poor.  - Diet history:  Pt reports that lately she has consumed a piece of licorice and a piece of cheese.  Oral intake has been poor.     - Tube feeding history:  PEG FT was placed on 18.  Pt has been infusing 4 cans Isosource 1.5 per day given in 3 feedings (1.5 cans in am, 1.5 cans at noon, and 1 can at HS) providin kcals (24 kcal/kg and 96% energy needs), 68 gm pro (1.1 gm/kg and 91% pro needs), 760 mL H20, 15 gm fiber, 176 gm CHO.  The H20 flushes were minimal (1/2 syringe before and after feedings - assume 30 mL).  She said they were restricting fluid because of \"that thing going on.\"   - Supplements:  Pt tried drinking Boost at one time but she said it made her sick to her stomach so she quit.   - No food allergies.  Pt has lost the weight \"all over\" and feels weak and deconditioned.    CURRENT NUTRITION ORDERS  Diet Order:     NPO " "  Nutrition Support Enteral:  Type of Feeding Tube:  PEG  Enteral Frequency:  Continuous  Enteral Regimen:  4 cans Isosource 1.5 given in 3 feedings per day  Total Enteral Provisions:  1500 kcals (24 kcal/kg and 96% energy needs), 68 gm pro (1.1 gm/kg and 91% protein needs), 760 mL H20, 15 gm fiber, 176 gm CHO  Free Water Flush:  None ordered    Current Intake/Tolerance:  Pt reports that she hasn't received her bolus feeding yet today.  She prefers to continue the bolus feeding schedule as that's what she's used to (vs change to continuous TF while in ICU).    PHYSICAL FINDINGS  Observed  Muscle Wasting - clavicle, temporal   Subcutaneous fat loss - orbital, arms  Obtained from Chart/Interdisciplinary Team  Edema - 2+ pitting to knees bilaterally    ANTHROPOMETRICS  Height: 5' 6\"  Weight: 138 lbs 3.65 oz  Body mass index is 22.31 kg/(m^2).  Weight Status:  Normal BMI  IBW:  61.3 kg  % IBW:  102%  Weight History:  Usual weight 157-160 lbs (January of 2018).  Thus 22 lb (14%) weight loss over past 2 months.    LABS  Labs reviewed  Na 128 (L) - Pt with h/o SIADH    MEDICATIONS  Medications reviewed  IVF NaCl at 100 mL/hr    Dosing Weight:  62.7 kg (current wt)    ASSESSED NUTRITION NEEDS PER APPROVED PRACTICE GUIDELINES:  Estimated Energy Needs:  3904-5293 kcals (25-30 Kcal/Kg)  Justification: maintenance  Estimated Protein Needs:  75-95 grams protein (1.2-1.5 g pro/Kg)  Justification: Repletion and hypercatabolism with acute illness  Estimated Fluid Needs:  4166-1962 mL (1 mL/Kcal)  Justification: maintenance and per provider pending fluid status    MALNUTRITION:  % Weight Loss:  > 5% in 1 month (severe malnutrition)  % Intake:  </= 50% for >/= 1 month (severe malnutrition)  Subcutaneous Fat Loss:  Orbital region mild depletion and Upper arm region mild depletion  Muscle Loss:  Temporal region mild depletion and Clavicle bone region mild depletion  Fluid Retention:  Mild - Could be partly nutrition " related    Malnutrition Diagnosis: Severe malnutrition  In Context of:  Acute illness or injury    NUTRITION DIAGNOSIS:  Unintended weight loss related to decreased appetite, early satiety, and impaired respiratory function as evidenced by reported 14% weight loss over the past 2 months, minimal oral intake, and reliance on bolus TF for nutrition    NUTRITION INTERVENTIONS  Recommendations / Nutrition Prescription  Continue TF bolus TF schedule as outlined above.    Free H20 30 mL before and after TID bolus feedings    Will monitor the need to increase TF volume as appropriate, as current regimen meeting lower end of needs.      Implementation  Nutrition education: Not appropriate at this time due to patient condition  Feeding Tube Flush - Ordered minimal H20 flushes in EPIC for now    Nutrition Goals  Bolus TF TID with 4 cans Isosource 1.5 daily will meet % estimated needs.      MONITORING AND EVALUATION:  Progress towards goals will be monitored and evaluated per protocol and Practice Guidelines    Cheryl Hanson RD, LD, CNSC[MH1.1]             Revision History        User Key Date/Time User Provider Type Action    > MH1.1 3/3/2018 12:43 PM Cheryl Hanson RD, ALLI Registered Dietitian Sign            Consults by Gerard Morris MD at 3/2/2018 11:12 PM     Author:  Gerard Morris MD Service:  Medical ICU Author Type:  Physician    Filed:  3/3/2018  3:33 AM Date of Service:  3/2/2018 11:12 PM Creation Time:  3/2/2018 10:53 PM    Status:  Addendum :  Gerard Morris MD (Physician)     Consult Orders:    1. Intensivist IP Consult: Patient to be seen: Routine - within 24 hours; Patient is in ICU; Consultant may enter orders: Yes [[id number]] ordered by Franky Dupree MD at 03/02/18 2332                [facility] Intensive Care Unit[JP1.1]  Consultation[JP1.2]  March 2, 2018      Haley Mckeon MRN# [id number]   Age: 67 year old YOB: 1950     Date of Admission:  3/2/2018    Primary care provider: Rhys Terry            Assessment and Plan:     Haley Mckeon is a 67 year old female admitted on 3/2/2018 for[JP1.1]   Chief Complaint   Patient presents with     Shortness of Breath     hx of lung ca treated at [facility], took a nap today and woke up feeling very SOB, has been feeling weak the last few days. next chemo is on monday.[JP1.2]   . My assessment and plan for this patient is as follows:    SUMMARY:[JP1.1] Haley Mckeon[JP1.3] is a[JP1.1] 67 year old[JP1.3] lady with stage IV lung cancer who presents with SOB. She h/o airway stents x2. PE has been ruled out by Chest CT. Possible pna in Rt. LL and BI stent and Rt. UL stents    Neurology and Psychiatry:  1.[JP1.1] Depression/Anxiety[JP1.4]    Plan:[JP1.1]   - cont home med regimen.[JP1.4]    Pulmonary:  1.[JP1.1] Acute on chronic hypoxic respiratory failure: Etiology is most likely combn of pna and mucous plugging of Rt. UL and BI.  2. H/o COPD: Will continue home inhaler regimen when able.[JP1.4]     Plan:[JP1.1]   - Will use BiPAP/O2 as needed.   - consider bronch to assess and remove secretions.   - Continue duoneb and HTS 3% 4ml BID.     - Consider trial of steroid burst with taper for possible COPD exacerbation.[JP1.4]    Cardiovascular system:  1.[JP1.1] Hypertension: BP well controlled.[JP1.4]  2. H/o Afib with RVR: Metoprolol and dilt - primarily for this reason.[JP1.2]    Plan:[JP1.1]  - cont metoprolol and Diltiazem.[JP1.4]    Renal/Electrolytes:  1.[JP1.1] H/o SIADH (Last hospital stay): Na remains low. But Cl is low too. Most likely hypovolemia[JP1.2]    Plan:[JP1.1]   - Will gently hydrate and follow BMP[JP1.2]    ID:  1.[JP1.1] HCAP: Based on recent hospital stay.[JP1.2]    Plan:[JP1.1]  - Cont Zosyn/V[JP1.2]a[JP1.5]nc.  - Try to obtain sputum cultures, if bronch done send secretions for cultures.[JP1.2]    GI/:   1.[JP1.1] GERD: Sx well controlled. Cont PPI.[JP1.4]  2. Nutrition:  Patient status post PEG  tube placement.  Continue Isosource 1.5, cans daily[JP1.5]    Plan:[JP1.1]   - Nutrition to be consulted in AM.[JP1.5]    Musculoskeletal/Rheumatology:   1.[JP1.1] No acute issues.[JP1.2]    Plan:    Endocrine:  1.[JP1.1] N[JP1.2]o[JP1.5] acute issues[JP1.2]    Plan:    Heme/Onc:  1.[JP1.1] On Anticoag: Presumed due to recent h/o of Afib/RVR.[JP1.2]    Plan:    ICU prophylaxis:      DVT; LMWH     VAP; As above     Restrain needed: No     Stress ulcer:[JP1.1] On[JP1.5] PPI     Central line: Needed today for IV access/Meds.    Code Status: DNR    Prognosis: Poor      Billing:[JP1.1] L4[JP1.5]    Gerard  Alexandra[JP1.1] MD.[JP1.4]           Treatment goals for next 24 hours:   1.[JP1.1] Monitor overnight.[JP1.5]  2.[JP1.1] Plan for bronch in AM[JP1.5]  3.[JP1.1] Hold lovenox till post bronch.[JP1.5]  4.  5.         History of Present Illness:     Haley Mckeon is a 67 year old lady who presents with shortness of breath. She has a history of lung cancer, and is normally treated at [facility], and has received 1 round of chemotherapy.[JP1.1]          She has h/o non-small cell lung carcinoma and mediastinal invasion complete right mainstem bronchus obstruction left mainstem external compression was treated at the [facility].  Patient was recently hospitalized at [facility] in [place] from February 8-23, 2018.[JP1.6] She was d/c'ed home on no oxygen.[JP1.5]    She[JP1.1] started feeling more SOB in the last few days and today[JP1.5] developed generalized weakness and increasing shortness of breath.   reports that the patient is normally on 1lpm NC oxygen supplementation at home, but today her oxygen[JP1.1] requirement[JP1.5] increased to 4-5 L and patient was only able to get into the low 90 range of saturation.  She denies any fevers, chills, syncope, numbness, chest pain, or any other symptoms.           Mechanical Ventilation:     Day #[JP1.1]   Resp: 21[JP1.2]  Peak airway pressure:   Plateau  airway pressure:   Auto-PEEP:            Lines:     PICC:    (placed on  )   Central venous line:  (placed on  )   Periph IV:  Vasc cath: (placed on )  Alvarado cath: (placed on[JP1.1] )[JP1.4]   ETT #  NGT: (placed on)   Feeding tube/Dobhoff: (placed on )   PEG: (placed on )   Tracheostomy: (placed on )   Chest tube: (placed on )          Feeding:     Enteral Feeding:[JP1.1]  Yes[JP1.5]  Route;[JP1.1]  Gtube[JP1.5]  Source;   Gastric residuals;     Parenteral feeding:   Route;   Day #    Blood glucose/Insulin requirement last 24 hours:              Allergies:[JP1.1]   No Known Allergies[JP1.2]             Past Medical History:[JP1.1]     Past Medical History:   Diagnosis Date     Atrial fibrillation with RVR (H)      CAD (coronary artery disease)     CT coronary angiogram in 8/9/13 - Ca score of 355.4.      Cancer (H)     lung - NSCLC. Treated with Cisplatin/Etoposide from 2/12 to 2/14, XRT x5 days - 2/9 to 2/14/18.     Chewing tobacco nicotine dependence      Chronic hypercapnic respiratory failure (H)      Chronic obstructive pulmonary disease with severity to be determined (H)     FEV1 - 34%, %, DLCO 63%     GERD (gastroesophageal reflux disease)      H/O asbestos exposure      History of SIADH 02/2018     Hyperlipidemia      Hypertension      Major depressive disorder in remission      FINA on CPAP      Radiation-induced esophagitis      UTI (urinary tract infection) 12/2017    E. coli[JP1.2]             Past Surgical History:[JP1.1]     Past Surgical History:   Procedure Laterality Date     BRONCHOSCOPY (RIGID OR FLEXIBLE), DILATE BRONCHUS / TRACHEA  02/07/2018    Tumor debulking with endobronchial stent placement to Rt. Mainstem bronchus, BI and Left main stem bronchus.[JP1.2]             Social History:[JP1.1]     Social History     Social History     Marital status:      Spouse name: N/A     Number of children: N/A     Years of education: N/A     Occupational History     Not on file.     Social  History Main Topics     Smoking status: Former Smoker     Smokeless tobacco: Not on file     Alcohol use No     Drug use: No     Sexual activity: No     Other Topics Concern     Not on file     Social History Narrative[JP1.2]         Smoking:[JP1.1]   Social History   Substance Use Topics     Smoking status: Former Smoker     Smokeless tobacco: Not on file     Alcohol use No[JP1.2]              Family History:[JP1.1]   No family history on file.[JP1.2]             Medications:[JP1.1]     Current Facility-Administered Medications   Medication     piperacillin-tazobactam (ZOSYN) 4.5 g vial to attach to  mL bag     naloxone (NARCAN) injection 0.1-0.4 mg     ipratropium - albuterol 0.5 mg/2.5 mg/3 mL (DUONEB) neb solution 3 mL     Patient is already receiving anticoagulation with heparin, enoxaparin (LOVENOX), warfarin (COUMADIN)  or other anticoagulant medication     sodium chloride 0.9% infusion     ondansetron (ZOFRAN-ODT) ODT tab 4 mg    Or     ondansetron (ZOFRAN) injection 4 mg     prochlorperazine (COMPAZINE) injection 5 mg    Or     prochlorperazine (COMPAZINE) tablet 5 mg    Or     prochlorperazine (COMPAZINE) Suppository 12.5 mg     potassium chloride SA (K-DUR/KLOR-CON M) CR tablet 20-40 mEq     potassium chloride (KLOR-CON) Packet 20-40 mEq     potassium chloride 10 mEq in 100 mL sterile water intermittent infusion (premix)     potassium chloride 10 mEq in 100 mL intermittent infusion with 10 mg lidocaine     potassium chloride 20 mEq in 50 mL intermittent infusion     magnesium sulfate 4 g in 100 mL sterile water (premade)     sodium chloride 3 % neb solution 3 mL     acetaminophen (TYLENOL) solution 975 mg     alum & mag hydroxide-simethicone (MYLANTA ES/MAALOX  ES) suspension 20 mL     diltiazem (CARDIZEM) tablet 90 mg     enoxaparin (LOVENOX) injection 90 mg     pantoprazole (PROTONIX) suspension 40 mg     metoprolol tartrate (LOPRESSOR) tablet 25 mg     oxyCODONE (ROXICODONE) solution 5 mg      "PARoxetine (PAXIL) tablet 20 mg     umeclidinium-vilanterol (ANORO ELLIPTA) 62.5-25 MCG/INH oral inhaler 1 puff     guaiFENesin (ROBITUSSIN) 20 mg/mL solution 15 mL     ipratropium - albuterol 0.5 mg/2.5 mg/3 mL (DUONEB) neb solution 3 mL     sodium chloride 3 % neb solution 3 mL[JP1.2]               Review of Systems:     General: no fever, chills, weakness  HEENT: No loss of hearing, vertigo, ear ringing, double/blurry vision, sore throat, epistaxis  Pulmonary:[JP1.1] see HPI.  S[JP1.5]leep: No EDS, snoring, insomnia, cataplexy, restless legs, REM behavior   CVS: No chest discomfort, palpitations  GI: No diarrhea, constipation, dysphagia, early satiety, bleeding  Renal: No pain on urination, hematuria, freq micturation  Musculoskeletal: No muscle ache, joint pain, swelling  Skin: No rash, ecchymosis, itching, icterus  Neurology: No tingling, weakness, numbness  Heme: No easy bruisibility or bleeding  Allergy: runny nose, sneezing, urticeria  Psychiatry: no change in mood and affect         Physical Examination:   General:[JP1.1] Oriented x3, on BiPAP.[JP1.4]  Vitals:[JP1.1] /47  Temp 98.2  F (36.8  C) (Axillary)  Resp 21  Ht 1.676 m (5' 6\")  Wt 62.7 kg (138 lb 3.7 oz)  SpO2 99%  BMI 22.31 kg/m2[JP1.2]   SpO2: 96 %[JP1.4]    HEENT: neck supple, symmetrical, no lymph node enlargement, thyromegaly, bruit, JVD, pupils are isocoric and equally responsive to the light. Mallampati score, Grade II  Lungs: both hemithoraces are symmetrical, normal to palpation, no dullness to percussion, auscultation of lungs revealed decreased bronchovesicular sounds[JP1.1] in the Rt lung especially upper lung zone[JP1.2], expirium prolongation, wheezing, rhonci and crackles  CVS: Normal S1 and S2, no additional heart sounds, murmur, rub, normal peripheral pulses  Abdomen: Bowel sounds present, soft, non tender, non distended, no organomegaly, ascitis, mass  Extremities/musculoskeletal: no peripheral edema, deformity, " cyanosis, clubbing  Neurology: alert, orientedx3, no motor/sensorial deficits, cranial nerves grossly normal  Skin: no rash  Psychiatry: Mood and affect are appropriate       Exam of Line sites: No erythema or discharge.          Labs:     CBC RESULTS:[JP1.1]       Recent Labs  Lab 03/02/18 1952   WBC 14.9*   RBC 3.15*   HGB 8.1*   HCT 25.6*   *[JP1.2]       Basic Metabolic Panel:[JP1.1]    Recent Labs  Lab 03/02/18 1952   *   POTASSIUM 3.8   CHLORIDE 86*   CO2 35*   BUN 18   CR 0.51*   *   SHEA 8.5[JP1.2]       INR[JP1.1]No lab results found in last 7 days.[JP1.2]         Gerard Powell Morris[JP1.1]             Revision History        User Key Date/Time User Provider Type Action    > JP1.5 3/3/2018  3:33 AM Gerard Morris MD Physician Addend     JP1.2 3/3/2018  3:21 AM Gerard Morris MD Physician Sign     JP1.6 3/2/2018 11:48 PM Gerard Morris MD Physician      JP1.4 3/2/2018 11:35 PM Gerard Morris MD Physician      JP1.3 3/2/2018 10:55 PM Gerard Morris MD Physician      JP1.1 3/2/2018 10:53 PM Gerard Morris MD Physician                      Progress Notes - Physician (Notes from 03/03/18 through 03/06/18)      Progress Notes by Yobany Perla RN at 3/6/2018  8:43 AM     Author:  Yobany Perla RN Service:  Care Coordinator Author Type:      Filed:  3/6/2018  8:48 AM Date of Service:  3/6/2018  8:43 AM Creation Time:  3/6/2018  8:43 AM    Status:  Signed :  Yobany Perla RN ()         Spoke with Micaela Headley at [facility]. Patient has been accepted at their facility Dr Mcelroy will be the accepting MD. [facility] transportation  set for 10:45 am. Ambulance to call facility 30mins prior to arrival for bed assignment. [phone]. Spoke with patient and she is in agreement with the transfer.[BV1.1]     Revision History        User Key Date/Time User Provider Type Action    > BV1.1 3/6/2018   8:48 AM Yobany Perla, RN Case Manager Sign            Progress Notes by Sandoval Wilson RT at 3/5/2018  8:48 PM     Author:  Sandoval Wilson RT Service:  Respiratory Therapy Author Type:  Respiratory Therapist    Filed:  3/5/2018  8:50 PM Date of Service:  3/5/2018  8:48 PM Creation Time:  3/5/2018  8:48 PM    Status:  Signed :  Sandoval Wilson RT (Respiratory Therapist)         Patient is on 6 LPM NC and SpO2 mid to high 90`s. Neb tx given as scheduled. BBS coarse/crackles. Weak cough. Orally suctioned for small amount of white thick secretions. Will continue to follow.[SK1.1]     Revision History        User Key Date/Time User Provider Type Action    > SK1.1 3/5/2018  8:50 PM Sandoval Wilson RT Respiratory Therapist Sign            Progress Notes by Jhonathan Gore RT at 3/5/2018  3:39 PM     Author:  Jhonathan Gore RT Service:  Respiratory Therapy Author Type:  Respiratory Therapist    Filed:  3/5/2018  3:41 PM Date of Service:  3/5/2018  3:39 PM Creation Time:  3/5/2018  3:39 PM    Status:  Signed :  Jhonathan Gore RT (Respiratory Therapist)         After neb tx at 1500, pt was having difficulty breathing, lung sounds coarse. RT suctioned at back of throat with a 14 fr catheter with improvement. Will continue to follow.[KV1.1]    Jhonathan Gore[KV1.2] RT[KV1.1]     Revision History        User Key Date/Time User Provider Type Action    > KV1.2 3/5/2018  3:41 PM Jhonathan Gore RT Respiratory Therapist Sign     KV1.1 3/5/2018  3:39 PM Jhonathan Gore RT Respiratory Therapist             Progress Notes by Santos Downing MD at 3/5/2018 11:18 AM     Author:  Santos Downing MD Service:  Hospitalist Author Type:  Physician    Filed:  3/5/2018 12:01 PM Date of Service:  3/5/2018 11:18 AM Creation Time:  3/5/2018 11:18 AM    Status:  Signed :  Santos Downing MD (Physician)         [facility]    Hospitalist Progress Note      Assessment & Plan   Haley Mckeon is a 67 year old female who was admitted on  3/2/2018. Past history of non-small cell lung cancer s/p bronchial stenting, A-fib, SIADH, COPD, radiation-induced esophagitis s/p PEG placement who presents with progressive SOB.    Healthcare associated pneumonia.  Admission CT chest with patchy infiltrate vs early metastatic disease of RLL and LLL.  Leukocytosis of 14.9 at admission, no other SIRS criteria present.  Received vanco 3/3-3/4; transitioned from zosyn to meropenem 3/4 due to progressive leukocytosis and procal with patient reporting increased dyspnea.  - continue meropenem  - sputum culture with normal keyona only  - leukocytosis continues upward trend but afebrile and currently stable from respiratory standpoint - ?reactive; will repeat procal[PB1.1] and also check C.diff as now reporting diarrhea[PB1.2]    Acute hypoxic respiratory failure.  Likely due to combination of healthcare associated pneumonia and mucous plugging of RUL bronchial stent noted on admission CT.   Repeat CT chest 3/4 due to increased dyspnea shows new plugging of RLL bronchus.  - per RT, chest physiotherapy contraindicated due to G-tube  - continue acapella and pulm toilet with RT[PB1.1]  - will stop 3% saline nebs (was on 0.9% nebs PTA), trial mucomyst nebs[PB1.2]  - antibiotics as above  - Pro-BNP elevated 3/4 with edematous extremities, but will hold on lasix due to ANGELO    ANGELO:  Baseline Cr 0.5, up to 1.04 on 3/4.  Likely contrast-induced nephropathy, though cannot rule out vancomycin.  Occasional blood pressures of 90's systolic but no significant hypotension.  UA 3/4 unremarkable, urine Na not consistent with pre-renal azotemia.  - Cr up to 1.28 on 3/5, monitor BMP daily    Pre-diabetes:  New diagnosis.  A1C 6.3% this admission.  - SSI    Hypokalemia, hypomagnesemia:  - replace per protocol, to receive K on 3/5    COPD: No signs of acute exacerbation.  - continue PTA Anora-Ellipta, Duonebs qid    SIADH:  Recent Na values about 130.  - Na trending slightly up, currently 140  on 3/5; monitor     Atrial fibrillation.  Continue PTA metoprolol and diltiazem for rate control; Lovenox for anticoagulation.     Radiation-induced esophagitis.  Patient status post PEG tube placement.    - Nutrition following for TF management     Non-small cell lung cancer.  [facility]PB1.1]   - outside images have been requested for comparison, but have not been pushed to our system[PB1.2]  -[PB1.1] was due to resume radiation and chemotherapy this week, as not near point of discharge, will ask Oncology to follow[PB1.2]    Chronic anemia.  Most recent hgb 2/26/18 was 8.5.    - hgb stable 7-8 g/dL on 3/5    # Pain Assessment:   Current Pain Score 3/5/2018 3/5/2018 3/5/2018   Patient currently in pain? yes sleeping: patient not able to self report -   Pain score (0-10) 4 - 3   Pain location Abdomen - -   Pain descriptors - - -   Haley bustillo pain level was assessed and she currently[PB1.1] reports burning pain around G-tube site.  Agreeable to continuing current pain regimen of prn tylenol and oxycodone.[PB1.2]    DVT Prophylaxis: Enoxaparin (Lovenox) SQ  Code Status: DNR    Disposition: Expected discharge pending improvement in dyspnea, ANGELO, signs of resolving infection.  >3 days.    Santos Downing    Interval History[PB1.1]   Reports dyspnea somewhat improved today.  Currently producing minimal sputum.  Denies any fever/chills, wheezing.  Does endorse some orthopnea.  Edema unchanged.  Reports burning pain around G-tube site and complains of some heartburn.  Also reporting 3 loose/watery BM's this AM.[PB1.2]    -Data reviewed today: I reviewed all new labs and imaging results over the last 24 hours. I personally reviewed no images or EKG's today.    Physical Exam   Temp: 98.1  F (36.7  C) Temp src: Axillary BP: 119/51 Pulse: 90 Heart Rate: 100 Resp: 18 SpO2: 93 % O2 Device: Nasal cannula Oxygen Delivery: 4 LPM  Vitals:    03/03/18 0000 03/04/18 0400 03/05/18 0644   Weight: 62.7 kg (138 lb  3.7 oz) 64.2 kg (141 lb 8.6 oz) 65.3 kg (143 lb 15.4 oz)     Vital Signs with Ranges  Temp:  [98  F (36.7  C)-98.1  F (36.7  C)] 98.1  F (36.7  C)  Pulse:  [90] 90  Heart Rate:  [] 100  Resp:  [16-22] 18  BP: (112-137)/(51-83) 119/51  FiO2 (%):  [40 %] 40 %  SpO2:  [93 %-98 %] 93 %  I/O last 3 completed shifts:  In: 7377 [I.V.:5220; NG/GT:300; IV Piggyback:1057]  Out: 2975 [Urine:2975]    Constitutional: Well developed, well nourished female in no acute distress  Respiratory:[PB1.1] Lung sounds slightly diminished on right, less coarse throughout today, no tachypnea or wheezing[PB1.2]  Cardiovascular: irregular rhythm, normal rate, normal S1/S2 without murmur, rubs or gallops  GI: abdomen soft, normal bowel sounds  Lymph:[PB1.1]  1[PB1.2]+ pitting edema to knees bilaterally  Other:  alert and appropriate, cranial nerves grossly intact    Medications     - MEDICATION INSTRUCTIONS -         albuterol  2.5 mg Nebulization Q4H     meropenem  1 g Intravenous Q12H     insulin aspart  1-7 Units Subcutaneous TID AC     insulin aspart  1-5 Units Subcutaneous At Bedtime     sodium chloride (PF)  3 mL Intracatheter Q8H     sodium chloride  3 mL Nebulization Q4H     diltiazem  90 mg Oral Q6H     enoxaparin  90 mg Subcutaneous Daily     pantoprazole  40 mg Per G Tube QAM AC     metoprolol tartrate (LOPRESSOR) tablet 25 mg  25 mg Per G Tube Q8H     PARoxetine (PAXIL) tablet 20 mg  20 mg Per G Tube Daily     umeclidinium-vilanterol  1 puff Inhalation Daily     ipratropium - albuterol 0.5 mg/2.5 mg/3 mL  3 mL Nebulization Once       Data     Recent Labs  Lab 03/05/18  0903 03/04/18  1425 03/04/18  0610 03/03/18  0425   WBC 26.7*  --  20.3* 16.6*   HGB 7.8*  --  7.3* 7.4*   MCV 84  --  82 81   *  --  480* 446     --  137 128*   POTASSIUM 3.1* 3.4 3.3* 3.7   CHLORIDE 100  --  98 88*   CO2 32  --  32 34*   BUN 25  --  22 15   CR 1.28*  --  1.04 0.51*   ANIONGAP 8  --  7 6   SHEA 8.7  --  8.2* 8.6   *  --  111*  143*       Recent Results (from the past 24 hour(s))   CT Chest w/o Contrast    Narrative    CT CHEST WITHOUT CONTRAST 3/4/2018 2:50 PM     HISTORY: Presenting with pneumonia, mucous plugging and history of  lung cancer; increasing dyspnea with progressing leukocytosis and  procalcitonin.     COMPARISON: CT chest 3/2/2018.    TECHNIQUE: Axial images from the thoracic inlet to the lung bases are  performed with additional coronal reformatted images. No contrast is  utilized.  Radiation dose for this scan was reduced using automated  exposure control, adjustment of the mA and/or kV according to patient  size, or iterative reconstruction technique.    FINDINGS:     Chest: Posterior medial left lower lobe consolidation is noted with  some degree of volume loss in the left lower lobe. Right lower lobe  pneumatocele is present. Bilateral bronchial stents are present. The  right lower lobe bronchial stent and right upper lobe bronchial stent  are occluded on series 3, image 27 and series 3, image 30 possibly due  to clot or hemorrhage. Infiltrative mass remains in the differential.  Right lower lobe bronchial stent was patent on prior recent CT but the  upper lobe stent was occluded. Findings would suggest progression of  underlying process possibly hemorrhage. Right suprahilar mass is  unchanged. Slight interval enlargement of the right pleural effusion  is noted. Trace amount left pleural fluid is also present which is new  since prior study. Increasing volume loss right lower lobe is likely  related to the occluded bronchiolar stent. Heart is normal in size.   No pericardial fluid is evident. Limited images upper abdomen are  grossly unremarkable. Degenerative spine changes are noted.      Impression    IMPRESSION:  1. Interval occlusion of the right lower lobe bronchiolar stent.  Persistent occlusion right upper lobe bronchiolar stent. This could be  due to hemorrhage or debris. Follow-up bronchoscopy for  further  evaluation. Left bronchiolar stent remains patent. Left lower lobe  infiltrate appears more consolidative now consistent with underlying  pneumonia.  2. Pleural effusions have increased slightly since the recent chest  CT. Volume loss right hemithorax is likely due to the occluded  bronchiolar stents.      KIRAN RUBIO MD[PB1.1]          Revision History        User Key Date/Time User Provider Type Action    > PB1.2 3/5/2018 12:01 PM Santos Downing MD Physician Sign     PB1.1 3/5/2018 11:18 AM Santos Downing MD Physician             Progress Notes by Elizabeth Fuentes, RT at 3/4/2018  8:08 PM     Author:  Elizabeth Fuentes RT Service:  (none) Author Type:  Respiratory Therapist    Filed:  3/4/2018  8:18 PM Date of Service:  3/4/2018  8:08 PM Creation Time:  3/4/2018  8:08 PM    Status:  Signed :  Elizabeth Fuentes RT (Respiratory Therapist)         Pt was put on HFNC and vest therapy per MD order, pt could only tolerate the HFNC for couple of hours and requested to go back on NC despite RT recommendation. She is now on 5LNC. Pt also could not tolerate the vest therapy for a while and stopped it multiple times. Pt reports abdominal pain near G-tube. After reducing the HZ and duration of the therapy pt did end up doing the treatment for 8 minuets on 8HZ. Pt reports it helped her breathing. Will continue to follow.[HA1.1]  3/4/2018  Elizabeth Fuentes[HA1.2]        Revision History        User Key Date/Time User Provider Type Action    > HA1.2 3/4/2018  8:18 PM Elizabeth Fuentes RT Respiratory Therapist Sign     HA1.1 3/4/2018  8:08 PM Elizabeth Fuentes, RT Respiratory Therapist             Progress Notes by Santos Downing MD at 3/4/2018  7:36 AM     Author:  Santos Downing MD Service:  Hospitalist Author Type:  Physician    Filed:  3/4/2018  3:55 PM Date of Service:  3/4/2018  7:36 AM Creation Time:  3/4/2018  7:36 AM    Status:  Addendum :  Santos Downing MD (Physician)         [facility]    Hospitalist  Progress Note      Assessment & Plan   Haley Mckeon is a 67 year old female who was admitted on 3/2/2018. Past history of non-small cell lung cancer s/p bronchial stenting, A-fib, SIADH, COPD, radiation-induced esophagitis s/p PEG placement who presents with progressive SOB.    Healthcare associated pneumonia.  Admission CT chest with patchy infiltrate vs early metastatic disease of RLL and LLL.  Leukocytosis of 14.9 at admission, no other SIRS criteria present.  - continue zosyn, will discontinue vanco as cultures remain negative  - sputum and blood cultures ngtd  - leukocytosis trending up but remains afebrile, will repeat procalcitonin[PB1.1]  ADDENDUM:  Paged regarding increased shortness of breath this afternoon.  Patient denies any chest pain/pressure, is on therapeutic lovenox.  Lung sounds are unchanged, but procalcitonin is up from admission.  Cultures remain ngtd.  Will switch zosyn to meropenem, repeat CT chest, check Pro-BNP as received IVF for ANGELO (but would not give lasix at this time as lactate just returned elevated).  Giving 500 NS now with repeat lactate in 2 hours.[PB1.2]  ADDENDUM:  CT chest shows new mucous plugging of right lower lobe bronchiolar stent.  Discussed with Intensivist who agrees that chest physiotherapy is next best step - patient now agreeable.  Intensivist also recommends diuretic despite elevated lactate (as is quite minimal) given elevated BNP and slight increase in pleural effusion compared to prior.  Bolus discontinued and ordered 40 mg IV lasix x1.[PB1.3]   [PB1.2]  Acute hypoxic respiratory failure.  Likely due to combination of healthcare associated pneumonia and mucous plugging of RUL bronchial stent noted on admission CT.    - continue 3% saline nebs q4h  - antibiotics as above  - [facility]AT consult for pulm toilet  - wean oxygen as able  - Intensivist input appreciated, no plan for bronch at this time    ANGELO:  Baseline Cr 0.5, up to 1.04 on 3/4.  Possibly contrast-induced  nephropathy vs vancomycin.  Occasional blood pressures of 90's systolic but no significant hypotension.    - check UA and urine sodium  - 1L NS   - monitor daily BMP    Pre-diabetes:  New diagnosis.  A1C 6.3% this admission.  - SSI    Hypokalemia, hypomagnesemia:  - replace per protocol    COPD: No signs of acute exacerbation.  - continue PTA Anora-Ellipta, Duonebs qid    SIADH:  Recent Na values about 130.  - monitor daily, Na up to 137 on 3/4     Atrial fibrillation.  Continue PTA metoprolol and diltiazem for rate control; Lovenox for anticoagulation.     Radiation-induced esophagitis.  Patient status post PEG tube placement.    - Nutrition following for TF management     Non-small cell lung cancer.  Patient is followed at the [facility].  Will defer consulting oncology service for now.  - believes she is due to chemo on 3/5, will attempt to contact [facility] in AM    Chronic anemia.  Most recent hgb 2/26/18 was 8.5.    - hgb stable 7-8 g/dL on 3/4    # Pain Assessment:   Current Pain Score 3/4/2018 3/4/2018 3/4/2018   Patient currently in pain? denies yes denies   Pain score (0-10) - - -   Pain location - Back -   Pain descriptors - Aching -   Haley s pain level was assessed and she currently denies pain.      DVT Prophylaxis: Enoxaparin (Lovenox) SQ  Code Status: DNR    Disposition: Expected discharge pending improvement in dyspnea, ANGELO, signs of resolving infection.  ?3 days.    Santos Downing    Interval History[PB1.1]   Patient reports feeling generally better today but reports ongoing dyspnea.  No significant wheezing. Is producing sputum with acapella.  No fever/chills.  No nausea or diarrhea.[PB1.4]    -Data reviewed today: I reviewed all new labs and imaging results over the last 24 hours. I personally reviewed no images or EKG's today.    Physical Exam   Temp: 97.6  F (36.4  C) Temp src: Axillary BP: 100/64   Heart Rate: 79 Resp: 18 SpO2: 94 % O2 Device: Nasal cannula Oxygen Delivery: 4 LPM  Vitals:     03/02/18 1946 03/03/18 0000 03/04/18 0400   Weight: 64 kg (141 lb) 62.7 kg (138 lb 3.7 oz) 64.2 kg (141 lb 8.6 oz)     Vital Signs with Ranges  Temp:  [97.6  F (36.4  C)-98.2  F (36.8  C)] 97.6  F (36.4  C)  Heart Rate:  [] 79  Resp:  [12-25] 18  BP: ()/(50-85) 100/64  SpO2:  [92 %-100 %] 94 %  I/O last 3 completed shifts:  In: 4338.33 [I.V.:3408.33; NG/GT:180]  Out: 1365 [Urine:1365]    Constitutional: Well developed, well nourished female in no acute distress, appearing fatigued  Respiratory:[PB1.1] Coarse lung sounds bilaterally with diminished right-sided lung sounds, no significant wheezing, mild tachypnea[PB1.4]  Cardiovascular: irregular rhythm,[PB1.1] normal rate[PB1.4], normal S1/S2 without murmur, rubs or gallops  GI: abdomen soft, non-tender, non-distended, normal bowel sounds  Lymph:  2+ pitting edema to knees bilaterally[PB1.1], mild macular rash over lower back[PB1.4]  Other:  alert and appropriate, cranial nerves grossly intact    Medications     - MEDICATION INSTRUCTIONS -       sodium chloride 100 mL/hr at 03/03/18 2341       insulin aspart  1-7 Units Subcutaneous TID AC     insulin aspart  1-5 Units Subcutaneous At Bedtime     piperacillin-tazobactam  4.5 g Intravenous Q6H     ipratropium - albuterol 0.5 mg/2.5 mg/3 mL  3 mL Nebulization Q4H     sodium chloride  3 mL Nebulization Q4H     diltiazem  90 mg Oral Q6H     enoxaparin  90 mg Subcutaneous Daily     pantoprazole  40 mg Per G Tube QAM AC     metoprolol tartrate (LOPRESSOR) tablet 25 mg  25 mg Per G Tube Q8H     PARoxetine (PAXIL) tablet 20 mg  20 mg Per G Tube Daily     umeclidinium-vilanterol  1 puff Inhalation Daily     magic mouthwash suspension (diphenhydrAMINE, lidocaine, aluminum-magnesium & simethicone)  15 mL Swish & Spit 4x Daily     ipratropium - albuterol 0.5 mg/2.5 mg/3 mL  3 mL Nebulization Once       Data     Recent Labs  Lab 03/04/18  0610 03/03/18  0425 03/02/18  1952   WBC 20.3* 16.6* 14.9*   HGB 7.3* 7.4* 8.1*    MCV 82 81 81   * 446 511*    128* 127*   POTASSIUM 3.3* 3.7 3.8   CHLORIDE 98 88* 86*   CO2 32 34* 35*   BUN 22 15 18   CR 1.04 0.51* 0.51*   ANIONGAP 7 6 6   SHEA 8.2* 8.6 8.5   * 143* 136*       No results found for this or any previous visit (from the past 24 hour(s)).[PB1.1]       Revision History        User Key Date/Time User Provider Type Action    > PB1.3 3/4/2018  3:55 PM Santos Downing MD Physician Addend     PB1.2 3/4/2018  2:51 PM Santos Downing MD Physician Addend     PB1.4 3/4/2018  8:11 AM Santos Downing MD Physician Sign     PB1.1 3/4/2018  7:36 AM Santos Downing MD Physician             Progress Notes by Santos Downing MD at 3/4/2018  2:46 PM     Author:  Santos Downing MD Service:  Hospitalist Author Type:  Physician    Filed:  3/4/2018  2:48 PM Date of Service:  3/4/2018  2:46 PM Creation Time:  3/4/2018  2:46 PM    Status:  Signed :  Santos Downing MD (Physician)         [facility]    Sepsis Evaluation Progress Note    Date of Service: 03/04/2018    I was called to see Haley Mckeon due to abnormal vital signs triggering the Sepsis SIRS screening alert. She is known to have an infection.     Physical Exam    Vital Signs:  Temp: 98  F (36.7  C) Temp src: Oral BP: 115/61 Pulse: 90 Heart Rate: 87 Resp: 22 SpO2: 95 % O2 Device: Nasal cannula Oxygen Delivery: 4 LPM    Lab:  Lactic Acid   Date Value Ref Range Status   03/04/2018 2.3 (H) 0.7 - 2.0 mmol/L Final       The patient is at baseline mental status.    The rest of their physical exam is significant for mildly coarse lung sounds with diminished right-sided sounds.    Assessment and Plan    The SIRS and exam findings are likely due to   sepsis.     ID: The patient is currently on the following antibiotics:[PB1.1]  Anti-infectives (Future)    Start     Dose/Rate Route Frequency Ordered Stop    03/04/18 1500  meropenem (MERREM) 1 g vial to attach to  mL bag     Comments:  Pharmacy can adjust dose  based on renal function.    1 g  over 30 Minutes Intravenous EVERY 8 HOURS 03/04/18 1445[PB1.2]          Current antibiotic coverage will be switched from zosyn to meropenm.    Fluid: Fluid bolus ordered.    Lab: Repeat lactic acid ordered for 2 hours from now.    Disposition: The patient will remain on the current unit. We will continue to monitor this patient closely.    Santos Downing MD[PB1.1]       Revision History        User Key Date/Time User Provider Type Action    > PB1.2 3/4/2018  2:48 PM Santos Downing MD Physician Sign     PB1.1 3/4/2018  2:46 PM Santos Downing MD Physician             Progress Notes by Elizabeth Fuentes RT at 3/4/2018  2:38 PM     Author:  Elizabeth Fuentes RT Service:  (none) Author Type:  Respiratory Therapist    Filed:  3/4/2018  2:47 PM Date of Service:  3/4/2018  2:38 PM Creation Time:  3/4/2018  2:38 PM    Status:  Signed :  Elizabeth Fuentes RT (Respiratory Therapist)         RT was called to evaluate pt, pt was SOB on 4LNC BBS coarse. RT offered nebulizer to pt. Pt refused. After explaining the medication to pt and encouraging her to take it as it may help with the SOB. Pt agreed. DuoNeb was given with some relief. Pt was taken for imaging diagnostic on 5LNC SpO2 98%. Will continue to follow.[HA1.1]  3/4/2018  Elizabeth Fuentes[HA1.2]       Revision History        User Key Date/Time User Provider Type Action    > HA1.2 3/4/2018  2:47 PM Elizabeth Fuentes RT Respiratory Therapist Sign     HA1.1 3/4/2018  2:38 PM Elizabeth Fuentes RT Respiratory Therapist             Progress Notes by Santos Juares MD at 3/4/2018  5:08 AM     Author:  Santos Juares MD Service:  Hospitalist Author Type:  Physician    Filed:  3/4/2018  5:09 AM Date of Service:  3/4/2018  5:08 AM Creation Time:  3/4/2018  5:08 AM    Status:  Signed :  Santos Juares MD (Physician)         Cross cover    Called about , not diabetic by H and P.    Will order SSI and check A1c.      Santos Juares  "MD[PD1.1]     Revision History        User Key Date/Time User Provider Type Action    > PD1.1 3/4/2018  5:09 AM Santos Juares MD Physician Sign            Progress Notes by Neelam Blas MD at 3/3/2018  6:20 PM     Author:  Neelam Blas MD Service:  ICU Author Type:  Physician    Filed:  3/3/2018  6:42 PM Date of Service:  3/3/2018  6:20 PM Creation Time:  3/3/2018  6:20 PM    Status:  Signed :  Neelam Blas MD (Physician)         Brief Progress/consult note    S: pt is a 66 yo woman with NSCLC admitted with acute on chronic hypoxic respiratory failure. Has been treated at [place], had stents placed into RU and R mainstem. CT notable for occlusion of the RUL stent, but R mainstem appears patent. Pt thinks she has been on O2 since December, has neb at home but doesn't think it is as helpful as the one here. Has been using flutter valve and is able to expectorate. Has been fatigued, has been very unclear on the details of recent treatment.     O:[LS1.1]   /57  Temp 98.2  F (36.8  C) (Oral)  Resp 16  Ht 1.676 m (5' 6\")  Wt 62.7 kg (138 lb 3.7 oz)  SpO2 98%  BMI 22.31 kg/m2[LS1.2]  Gen: appears stated age, mild distress  HEENT: NC in place. Eyes PERRL. Oral mucosa dry.  Resp: tachypneic. No retractions but using pursed lip breathing. Coarse breath sounds bilaterally.   CV: rrr. No MRG. 2+ LE edema bilaterally  Skin: no rash  Neuro: mentation intact, speech fluent, CN intact bilaterally.    Labs reviewed. Persistent hyponatremia. Renal function OK. Leukocytosis and anemia.    A/P:    66 yo woman with NSCLC admitted with acute on chronic hypoxic respiratory failure.    1) HCAP: recent admission at [facility], now with consolidations on CXR- likely infection but could also represent disease progression. Agree with pip/tazo.    2) NSCLC: has had 2 stents placed in airways; R upper lobe stent appears occluded; not clear if this is chronic or " not. Right mainstem stent is widely patent, much of RML and RLL open and participating in gas exchange except for scattered consolidations. Would consider discussion with [facility] interventionalists vs [facility] interventionalists- not clear that she would benefit from stent exchange or debulking at this point. Pt thinks she is scheduled for chemo on Monday- would ideally discuss with [facility]. Agree with aggressive airway clearance, hypertonic saline, flutter valve.    3) afib with rvr, complicated last 2 hospitalizations. Well controlled on metop and dilt today.    4) SIADH, sodium stable. Fluid restriction.     5) COPD: on umeclidinium/vilanterol. No indication for steroids at this point. Continue scheduled albuterol/ipratropium.[LS1.1]     Revision History        User Key Date/Time User Provider Type Action    > LS1.2 3/3/2018  6:42 PM Neelam Blas MD Physician Sign     LS1.1 3/3/2018  6:20 PM Neelam Blas MD Physician             Progress Notes by Jeanine Weems RN at 3/3/2018 11:30 AM     Author:  Jeanine Weems RN Service:  Skilled Nursing Author Type:  Registered Nurse    Filed:  3/3/2018  1:12 PM Date of Service:  3/3/2018 11:30 AM Creation Time:  3/3/2018  1:12 PM    Status:  Signed :  Jeanine Weems RN (Registered Nurse)         Ok to feed patient per Dr. Blas[AK1.1]     Revision History        User Key Date/Time User Provider Type Action    > AK1.1 3/3/2018  1:12 PM Jeanine Weems RN Registered Nurse Sign            Progress Notes by Santos Downing MD at 3/3/2018  7:28 AM     Author:  Santos Downing MD Service:  Hospitalist Author Type:  Physician    Filed:  3/3/2018  7:57 AM Date of Service:  3/3/2018  7:28 AM Creation Time:  3/3/2018  7:28 AM    Status:  Signed :  Santos Downing MD (Physician)         [facility]    Hospitalist Progress Note      Assessment & Plan   Haley Mckeon is a 67 year old female who was admitted on  "3/2/2018. Past history of non-small cell lung cancer s/p bronchial stenting, A-fib, SIADH, COPD, radiation-induced esophagitis s/p PEG placement who presents with progressive SOB.    Healthcare associated pneumonia.  Admission CT chest with patchy infiltrate vs early metastatic disease of RLL and LLL.  Leukocytosis of 14.9 at admission, no other SIRS criteria present.  - continue vanco and zosyn, likely narrow to zosyn alone tomorrow pending negative cultures  - blood cultures ngtd  - sputum culture pending collection     Acute hypoxic respiratory failure.  Likely due to combination of healthcare associated pneumonia and mucous plugging of RUL bronchial stent noted on admission CT.    - continue 3% saline nebs q4h  - antibiotics as above  - RCAT consult for pulm toilet  - wean oxygen as able    COPD: No signs of acute exacerbation.  - continue PTA Anora-Ellipta, Duonebs qid    SIADH:  Na stable at baseline of 128.  - monitor daily     Atrial fibrillation.  Continue PTA metoprolol and diltiazem for rate control; Lovenox for anticoagulation.     Radiation-induced esophagitis.  Patient status post PEG tube placement.    - Continue Isosource 1.5, cans daily  - Nutrition to consult     Non-small cell lung cancer.  Patient is followed at the [facility].  Will defer consulting oncology service for now.    Chronic anemia.  Most recent hgb 2/26/18 was 8.5.    - hgb 7.4 on 3/3, monitor daily    # Pain Assessment:   Current Pain Score 3/3/2018 3/3/2018 3/3/2018   Patient currently in pain? sleeping: patient not able to self report denies denies[PB1.1]   Haley bustillo pain level was assessed and she currently denies pain.[PB1.2]      DVT Prophylaxis: Enoxaparin (Lovenox) SQ  Code Status: DNR    Disposition: Expected discharge in 3 days once respiratory failure resolved, transitioned to oral antibiotics.    Santos Downing    Interval History[PB1.1]   Patient reports feeling \"like crap\" but has no specific complaints.  Feels dyspnea is " slightly worse today.  No fever/chills, no chest pain/pressure, palpitations, lightheadedness.  No nausea or other complaints.[PB1.2]    -Data reviewed today: I reviewed all new labs and imaging results over the last 24 hours. I personally reviewed[PB1.1] no images or EKG's today[PB1.2].    Physical Exam   Temp: 98.2  F (36.8  C) Temp src: Axillary BP: 116/53   Heart Rate: 81 Resp: 21 SpO2: 98 % O2 Device: Nasal cannula Oxygen Delivery: 4 LPM  Vitals:    03/02/18 1946 03/03/18 0000   Weight: 64 kg (141 lb) 62.7 kg (138 lb 3.7 oz)     Vital Signs with Ranges  Temp:  [98.2  F (36.8  C)] 98.2  F (36.8  C)  Heart Rate:  [65-99] 81  Resp:  [18-24] 21  BP: (104-139)/(47-87) 116/53  SpO2:  [95 %-99 %] 98 %  I/O last 3 completed shifts:  In: 150   Out: 560 [Urine:560]    Constitutional: Well developed, well nourished[PB1.1] female[PB1.2] in no acute distress[PB1.1], appearing fatigued[PB1.2]  Respiratory:[PB1.1] Mild right upper lobe crackles with overall diminished right sided lung sounds, no wheezing, mild tachypnea[PB1.2]  Cardiovascular:[PB1.1] irregular rhythm, tachycardic[PB1.2], normal S1/S2 without murmur, rubs or gallops  GI: abdomen soft, non-tender, non-distended, normal bowel sounds[PB1.1]  Lymph[PB1.2]:[PB1.1]  2+ pitting edema to knees bilaterally[PB1.2]  Other:  alert and appropriate, cranial nerves grossly intact    Medications     - MEDICATION INSTRUCTIONS -       sodium chloride 100 mL/hr (03/03/18 0125)       piperacillin-tazobactam  4.5 g Intravenous Q6H     ipratropium - albuterol 0.5 mg/2.5 mg/3 mL  3 mL Nebulization Q4H     sodium chloride  3 mL Nebulization Q4H     diltiazem  90 mg Oral Q6H     enoxaparin  90 mg Subcutaneous Daily     pantoprazole  40 mg Per G Tube QAM AC     metoprolol tartrate (LOPRESSOR) tablet 25 mg  25 mg Per G Tube Q8H     PARoxetine (PAXIL) tablet 20 mg  20 mg Per G Tube Daily     umeclidinium-vilanterol  1 puff Inhalation Daily     vancomycin (VANCOCIN) IV  1,500 mg  "Intravenous Q12H     ipratropium - albuterol 0.5 mg/2.5 mg/3 mL  3 mL Nebulization Once       Data     Recent Labs  Lab 03/03/18  0425 03/02/18  1952   WBC 16.6* 14.9*   HGB 7.4* 8.1*   MCV 81 81    511*   * 127*   POTASSIUM 3.7 3.8   CHLORIDE 88* 86*   CO2 34* 35*   BUN 15 18   CR 0.51* 0.51*   ANIONGAP 6 6   SHEA 8.6 8.5   * 136*       Recent Results (from the past 24 hour(s))   Chest CT, IV contrast only - PE protocol    Narrative    CT CHEST PULMONARY EMBOLISM WITH CONTRAST   3/2/2018 8:55 PM     HISTORY: Lung cancer, stents, prior mucous plugging and rigid  bronchoscopy at [place], \"can't breath\".      TECHNIQUE: 58 mL Isovue-370. Radiation dose for this scan was reduced  using automated exposure control, adjustment of the mA and/or kV  according to patient size, or iterative reconstruction technique.    COMPARISON: None.    FINDINGS: Large right hilar mass with stent in the right mainstem  bronchus. Stent in right upper lobe bronchus appears occluded.  Extensive right paratracheal and subcarinal adenopathy. Patchy  infiltrates or early small metastatic nodules in the right lower lobe  and mildly so on the left lower lobe. Small right pleural effusion.  Scans through the upper abdomen are unremarkable.    No left-sided pulmonary emboli. There are patent right upper lobe  pulmonary vessels without evidence of upper lobe pulmonary embolus.  The pulmonary artery on the right is occluded proximal to the branches  to the right middle lobe and right lower lobe likely as a result of  the large right hilar mass. No definite filling defects just absence  of contrast of the right middle and right lower lobe pulmonary  arteries.      Impression    IMPRESSION:  1. The right middle and lower lobe pulmonary arteries appear occluded  by a large right hilar mass. There are no specific filling defects,  but apparent occlusion just prior to the branching of the right middle  and right lower lobe pulmonary artery " branches. It would be difficult  to exclude pulmonary emboli here, but it has the appearance of  occlusion due to mass effect.  2. No left-sided pulmonary emboli.  3. Very prominent right paratracheal and subcarinal adenopathy.  4. Patchy infiltrates or early small metastatic nodules in the right  lower lobe and mildly so on the left lower lobe.  5. Small right pleural effusion.   6. Right mainstem bronchus stent appears patent extending toward the  right lower lobe. The right upper lobe bronchus stent appears  occluded.    DELROY PEREZ MD[PB1.1]          Revision History        User Key Date/Time User Provider Type Action    > PB1.2 3/3/2018  7:57 AM Santos Downing MD Physician Sign     PB1.1 3/3/2018  7:28 AM Santos Downing MD Physician             ED Provider Notes by Wolfgang Ramos MD at 3/2/2018  7:40 PM     Author:  Wolfgang Ramos MD Service:  Emergency Medicine Author Type:  Physician    Filed:  3/3/2018 12:19 AM Date of Service:  3/2/2018  7:40 PM Creation Time:  3/2/2018  8:02 PM    Status:  Signed :  Wolfgang Ramos MD (Physician)           History     Chief Complaint:  Shortness of Breath    HPI   Haley Mkceon is a 67 year old female who presents with[BW1.1] shortness of breath.  Patient has a history of lung cancer, and is normally treated at [facility], and has received 1 round of chemotherapy.  She states she was released from the hospital last week and today has developed generalized weakness and increasing shortness of breath.   reports that the patient is normally on oxygen supplementation at home, but today her oxygen was increased to 4-5 L and patient was only able to get into the low 90 range of saturation.  On presentation, patient states that she is fatigued and short of breath, but denies any fevers,[BW1.2] chills, syncope, numbness, chest pain[BW1.1], or any other symptoms.[BW1.2]    Allergies:  No known drug allergies.    Medications:    The patient is not currently taking any  "prescribed medications.     Past Medical History:    Lung Cancer    Past Surgical History:    Pulmonary Stent placement    Family History:    No pertinent family history.    Social History:  Smoking status: Former smoker  Alcohol use: No  Marital Status:  Single     Review of Systems   Constitutional: Positive for[BW1.1] fatigue[BW1.2]. Negative for[BW1.1] fever[BW1.2].   Eyes: Negative for[BW1.1] visual disturbance[BW1.2].   Respiratory: Positive for[BW1.1] shortness of breath[BW1.2]. Negative for[BW1.1] cough[BW1.2].    Cardiovascular: Negative for[BW1.1] chest pain[BW1.2].   Neurological: Positive for[BW1.1] weakness[BW1.2]. Negative for[BW1.1] dizziness[BW1.2],[BW1.1] numbness[BW1.2] and[BW1.1] headaches[BW1.2].[BW1.1]   All other systems reviewed and are negative[BW1.2].    Physical Exam[BW1.1]     Patient Vitals for the past 24 hrs:   BP Temp Temp src Heart Rate Resp SpO2 Height Weight   03/02/18 1946 123/60 98.2  F (36.8  C) Oral 98 22 96 % 1.702 m (5' 7\") 64 kg (141 lb)[BW1.3]         Physical Exam[BW1.1]  GENERAL: well developed, pleasant.[BW1.2] Looks uncomfortable.[BW1.1]  HEAD: atraumatic  EYES: pupils reactive, extraocular muscles intact, conjunctivae normal  ENT:  mucus membranes moist  NECK:  trachea midline, normal range of motion  RESPIRATORY:[BW1.2] Coarse breath sounds bilaterally. Occasional wheeze[BW1.1]  CVS: normal S1/S2, no murmurs, intact distal pulses  ABDOMEN: soft, nontender, nondistention  MUSCULOSKELETAL: no deformities  SKIN: warm and dry, no acute rashes or ulceration  NEURO: GCS 15, cranial nerves intact, alert and oriented x3  PSYCH:  Mood/affect normal[BW1.2]    Emergency Department Course   Imaging:[BW1.1]  Chest CT with IV Contrast:[BW1.2]   IMPRESSION:  1. The right middle and lower lobe pulmonary arteries appear occluded by a large right hilar mass. There are no specific filling defects, but apparent occlusion just prior to the branching of the right middle and right lower lobe " pulmonary artery branches. It would be difficult to exclude pulmonary emboli here, but it has the appearance of occlusion due to mass effect.  2. No left-sided pulmonary emboli.  3. Very prominent right paratracheal and subcarinal adenopathy.  4. Patchy infiltrates or early small metastatic nodules in the right lower lobe and mildly so on the left lower lobe.  5. Small right pleural effusion.   6. Right mainstem bronchus stent appears patent extending toward the right lower lobe. The right upper lobe bronchus stent appears occluded.[BW1.4]  Report per radiology.[BW1.2]       Laboratory:[BW1.1]  CBC:  WBC 14.9 (H), HGB 8.1 (L),  (H),  BMP:  (L), Chloride 86 (L), Carbon Dioxide 35 (H), Glucose 136 (H), Creatinine 0.51 (L), otherwise WNL  (1952) D Dimer: 3.2 (H)  (1952) Procalcitonin:[BW1.2] 0.56[BW1.4]  Blood culture #1: pending  Blood culture #2: pending[BW1.2]    Interventions:[BW1.1]  (2033) Albuterol-Ipratropium inhalation solution, 2.5 mg-0.5 mg/3 ml; 3 mL, inhalation[BW1.2]  (230[BW1.4]6[BW1.3]) Zosyn infusion 4.5g, IV infusion  (23[BW1.4]1[BW1.3]5) Sodium chloride 3% neb solution, 3 mL[BW1.4]    Emergency Department Course:  Nursing notes and vitals reviewed.  ([BW1.5]2002) I performed an exam of the patient as documented above.[BW1.1]    The patient was sent for a CT scan while in the emergency department, findings above.   Blood was drawn from the patient. This was sent for laboratory testing, findings above.   The patient's nose was swabbed and this sample was sent for influenza screen, findings above.[BW1.2]     (2245) I spoke with [BW1.4] Cipriano[BW1.3] about the patient and her current status, admission was recommended at this time to ICU.    Findings and plan explained to the patient and family who consents to admission.   ([BW1.4]7400[BW1.3]) I discussed the patient with [BW1.4] Joon[BW1.3] of the hospitalist service, who will admit the patient to a ICU bed for further monitoring,  evaluation, and treatment.[BW1.4]  Impression & Plan    Medical Decision Making:[BW1.1]  Haley Mckeon is a 67 year old female who presents with worsening shortness of breath in the setting of lung cancer with stents, mucus plugging[BW1.3] after stents have been placed with bronchoscopy to remove plugging[SA1.1], and COPD. Certainly pneumonia, mucus plug, pulmonary embolism, or cancer are all considered. Repeat CT shows findings as above, most notably for mucus plugging in the right upper lobe, most likely indicating metastatic disease versus pneumonia. White count is slightly elevated and procalcitonin is suggesting antibiotics. Given recent hospitalization, patient was given Zosyn after blood cultures were obtained. Went in to talk to the patient about the findings and patient is tripoding, not knowing how much longer she can do this. Patient was ordered bi-pap but refused stating she is too claustrophobic with it. Patient was given Duo Neb with 3% normal saline neb to help out with any mucus plugging and to loosen up secretions. She is now resting more comfortably and does not look to be requiring intubation. Did speak with Dr. Dupree with the hospitalist group as well as Dr. Cota from the intensivist as well as component of pulmonology if possible bronchoscopy. Patient will be admitted to the ICU with ongoing care. Discussed code status with them and at this time is full code.[BW1.3]     Diagnosis:[BW1.1]    ICD-10-CM   1. Pneumonia of both lower lobes due to infectious organism J18.9   2. Malignant neoplasm of right lung, unspecified part of lung (H) C34.91[BW1.3]       Disposition:[BW1.1]  Patient is admitted to a ICU bed under the care of [BW1.4] Joon[BW1.3].[BW1.4]              I, Luigi Fofana, am serving as a scribe on 3/2/2018 at 8:02 PM to personally document services performed by Dr. Ramos based on my observations and the provider's statements to me.       Luigi Fofana  3/2/2018   [facility]  EMERGENCY DEPARTMENT[BW1.1]       Wolfgang Ramos MD  03/03/18 0019  [SA1.2]     Revision History        User Key Date/Time User Provider Type Action    > SA1.2 3/3/2018 12:19 AM Wolfgang Ramos MD Physician Sign     SA1.1 3/3/2018 12:17 AM Wolfgang Ramos MD Physician      BW1.3 3/2/2018 11:50 PM Ct, [place] Scribe Share     BW1.4 3/2/2018 11:05 PM [place], [place] Scribe Share     BW1.5 3/2/2018  9:01 PM Ct, [place] Scribe Share     BW1.2 3/2/2018  8:56 PM Ct, [place] Scribe      BW1.1 3/2/2018  8:19 PM Ct, [place] Scribe Share                  Procedure Notes     No notes of this type exist for this encounter.         Progress Notes - Therapies (Notes from 03/03/18 through 03/06/18)      Progress Notes by Sandoval Wilson RT at 3/5/2018  8:48 PM     Author:  Sandoval Wilson RT Service:  Respiratory Therapy Author Type:  Respiratory Therapist    Filed:  3/5/2018  8:50 PM Date of Service:  3/5/2018  8:48 PM Creation Time:  3/5/2018  8:48 PM    Status:  Signed :  Sandoval Wilson RT (Respiratory Therapist)         Patient is on 6 LPM NC and SpO2 mid to high 90`s. Neb tx given as scheduled. BBS coarse/crackles. Weak cough. Orally suctioned for small amount of white thick secretions. Will continue to follow.[SK1.1]     Revision History        User Key Date/Time User Provider Type Action    > SK1.1 3/5/2018  8:50 PM Sandoval Wilson RT Respiratory Therapist Sign            Progress Notes by Jhonathan Gore RT at 3/5/2018  3:39 PM     Author:  Jhonathan Gore RT Service:  Respiratory Therapy Author Type:  Respiratory Therapist    Filed:  3/5/2018  3:41 PM Date of Service:  3/5/2018  3:39 PM Creation Time:  3/5/2018  3:39 PM    Status:  Signed :  Jhonathan Gore RT (Respiratory Therapist)         After neb tx at 1500, pt was having difficulty breathing, lung sounds coarse. RT suctioned at back of throat with a 14 fr catheter with improvement. Will continue to follow.[KV1.1]    Jhonathan Gore[KV1.2] RT[KV1.1]      Revision History        User Key Date/Time User Provider Type Action    > KV1.2 3/5/2018  3:41 PM Jhonathan Gore, RT Respiratory Therapist Sign     KV1.1 3/5/2018  3:39 PM Jhonathan Gore, RT Respiratory Therapist             Progress Notes by Elizabeth Fuentes RT at 3/4/2018  8:08 PM     Author:  Elizabeth Fuentes RT Service:  (none) Author Type:  Respiratory Therapist    Filed:  3/4/2018  8:18 PM Date of Service:  3/4/2018  8:08 PM Creation Time:  3/4/2018  8:08 PM    Status:  Signed :  Elizabeth Fuentes RT (Respiratory Therapist)         Pt was put on HFNC and vest therapy per MD order, pt could only tolerate the HFNC for couple of hours and requested to go back on NC despite RT recommendation. She is now on 5LNC. Pt also could not tolerate the vest therapy for a while and stopped it multiple times. Pt reports abdominal pain near G-tube. After reducing the HZ and duration of the therapy pt did end up doing the treatment for 8 minuets on 8HZ. Pt reports it helped her breathing. Will continue to follow.[HA1.1]  3/4/2018  Elizabeth Fuentes[HA1.2]        Revision History        User Key Date/Time User Provider Type Action    > HA1.2 3/4/2018  8:18 PM Elizabeth Fuentes RT Respiratory Therapist Sign     HA1.1 3/4/2018  8:08 PM Elizabeth Fuentes RT Respiratory Therapist             Progress Notes by Elizabeth Fuentes RT at 3/4/2018  2:38 PM     Author:  Elizabeth Fuentes RT Service:  (none) Author Type:  Respiratory Therapist    Filed:  3/4/2018  2:47 PM Date of Service:  3/4/2018  2:38 PM Creation Time:  3/4/2018  2:38 PM    Status:  Signed :  Elizabeth Fuentes RT (Respiratory Therapist)         RT was called to evaluate pt, pt was SOB on 4LNC BBS coarse. RT offered nebulizer to pt. Pt refused. After explaining the medication to pt and encouraging her to take it as it may help with the SOB. Pt agreed. DuoNeb was given with some relief. Pt was taken for imaging diagnostic on 5LNC SpO2 98%. Will continue to follow.[HA1.1]  3/4/2018  Elizabeth  Alfredo[HA1.2]       Revision History        User Key Date/Time User Provider Type Action    > HA1.2 3/4/2018  2:47 PM Elizabeth Fuentes, RT Respiratory Therapist Sign     HA1.1 3/4/2018  2:38 PM Elizabeth Fuentes, RT Respiratory Therapist

## 2018-03-03 PROBLEM — J18.9 HCAP (HEALTHCARE-ASSOCIATED PNEUMONIA): Status: ACTIVE | Noted: 2018-01-01

## 2018-03-03 NOTE — H&P
History and Physical     Haley Mckeon MRN# [id number]   YOB: 1950 Age: 67 year old      Date of Admission:  3/2/2018    Primary care provider: Rhys Terry          Assessment and Plan:   This is a  67 year old female admitted with healthcare associated pneumonia and mucous plugging of right upper lobe bronchial stent.    1.  Healthcare associated pneumonia.  He has been initiated on Zosyn.  Cultures are pending.    2.  Mucus plugging.  CT of the chest demonstrates occlusion of the right upper lobe bronchial stent.  Patient be admitted to the intensive care unit will consult the intensivist for consideration of bronchoscopy.  For now continue duo nebs and 3% saline nebs.    3.  Acute hypoxic restaurant failure.  Likely due to combination of healthcare associated pneumonia and mucous plugging continue supplemental oxygen.  Patient will be admitted to the intensive care unit for close restaurant monitoring patient is okay with intubation but does not want cardiac resuscitation.    4.  Atrial fibrillation.  Patient is rate controlled on metoprolol and diltiazem.  Will continue.  Continue Lovenox for anticoagulation.    5.  Pain control.  Continue oxycodone.  Monitor for respiratory suppression.    6.  Radiation-induced esophagitis.  Patient status post PEG tube placement.  Continue Isosource 1.5, cans daily.  Consult nutrition.    7.  Non-small cell lung cancer.  Patient is followed at the [facility].  Will defer consulting oncology service for now.         Attestation:  This patient has been seen and evaluated by me, Franky Dupree MD.           Chief Complaint:   This patient is a 67 year old female who presents with shortness of breath.    History is obtained from the patient and medical records.         History of Present Illness:   This is a 67-year-old female with non-small cell lung carcinoma and mediastinal invasion complete right mainstem bronchus obstruction left mainstem external  compression was treated at the [facility].  Patient status post bronchoscopy with debulking and placement of the endobronchial stents on January 5, 2018.  Patient was recently hospitalized at [facility] from February 8-23, 2018 after she was found to have complete opacification of right hemithorax.  She underwent rigid bronchoscopy on February 7 with stent exchange.  Post procedure she was admitted to the intensive care unit due to tachyarrhythmia need for phenylephrine drip.  She underwent PEG tube placement on February 12.  She was treated with 5 fractions of radiation therapy from February 9-14.  Ultimate plan is for 4500 cGy over 15 fractions broken down into 3 courses of 5 treatments  by 2 week break.  She completed 1 cycle of cisplatin etoposide chemotherapy from February 12 of the 14th.  She underwent flexible bronchoscopy for airway inspection and secretion clearance from bilateral mainstems on February 15.  Post procedure was again complicated by atrial fibrillation with rapid ventricular response requiring ICU admission.  She was treated with IV esmolol amiodarone a digoxin load and diltiazem infusions.  She was then transitioned to oral metoprolol and diltiazem.  Lovenox was initiated for anticoagulation due to atrial fibrillation.  Patient also developed SIADH with discharge sodium of 128 treated with fluid restriction.  Patient states that she initially did well but has had gradually worsening shortness of breath over the last week.   She denies fevers or chills.  Her shortness of breath has dramatically worse today.  She has been unable to tolerate her nebulizers at home due to shortness of breath.  She denies nasal congestion or runny nose.  She denies vomiting or diarrhea.  She denies chest pain pressure palpitations.  She has slight lower extremity edema that is unchanged.  She denies dysuria or hematuria.  He has been tolerating her tube feeds without difficulty.         Past Medical History:     Past Medical History:   Diagnosis Date     Cancer (H)     lung   Non-small cell lung cancer with mediastinal invasion, stage III.  Rigid bronchoscopy with tumor debulking and endobronchial stent placement to right mainstem, rhonchus  intermedius, left mainstem.  Atrial fibrillation with rapid ventricular response.  SIADH.  Severe COPD, FEV1 34%, %, DLCO 63% of predicted.  Chronic hypercapnic restaurant failure.  Malnutrition.  Normocytic anemia.  PEG tube placement.  Radiation-induced esophagitis.          Past Surgical History:   History reviewed. No pertinent surgical history.          Social History:     Social History   Substance Use Topics     Smoking status: Former Smoker     Smokeless tobacco: Not on file     Alcohol use No             Family History:   No family history on file.          Immunizations:     There is no immunization history on file for this patient.         Allergies:   No Known Allergies          Medications:     (Not in a hospital admission)          Review of Systems:   The 10 point Review of Systems is negative other than noted in the HPI           Physical Exam:   Vitals were reviewed  Temp: 98.2  F (36.8  C) Temp src: Oral BP: 123/60   Heart Rate: 98 Resp: 22 SpO2: 96 % O2 Device: Nasal cannula Oxygen Delivery: 5 LPM    This is a debilitated appearing chronically ill female with increased work of breathing.  Head: atraumatic normocephalic, sclera noninjected and anicterric, oral mucosa moist without lesions or exudates.  Neck: supple without spinal abnormality  Chest: Rhonchi bilaterally, without wheezes or rales.  Cardiovascular: regular rate and rhythm, no murmurs, gallops, or rubs, BLE 1 + edema  Abdomen: bowel sounds normal, nontender and nondistended, no hepatosplenomegaly or masses.  Musculoskeletal: normal muscle mass and tone  Skin: no rashes  Lymph: no lymphadenopathy.  Neuro: cranial nerves II-XII intact, strength in a extremities normal,  reflexes normal, coordination normal.         Data:   All laboratory data reviewed  All cardiac studies reviewed by me.  All imaging studies reviewed by me.

## 2018-03-03 NOTE — ED NOTES
Bed: ED29  Expected date: 3/2/18  Expected time: 7:30 PM  Means of arrival: Ambulance  Comments:  Margi 532 67f lung CA; SOB

## 2018-03-03 NOTE — PROGRESS NOTES
[facility]    Hospitalist Progress Note      Assessment & Plan   Haley Mckeon is a 67 year old female who was admitted on 3/2/2018. Past history of non-small cell lung cancer s/p bronchial stenting, A-fib, SIADH, COPD, radiation-induced esophagitis s/p PEG placement who presents with progressive SOB.    Healthcare associated pneumonia.  Admission CT chest with patchy infiltrate vs early metastatic disease of RLL and LLL.  Leukocytosis of 14.9 at admission, no other SIRS criteria present.  - continue vanco and zosyn, likely narrow to zosyn alone tomorrow pending negative cultures  - blood cultures ngtd  - sputum culture pending collection     Acute hypoxic respiratory failure.  Likely due to combination of healthcare associated pneumonia and mucous plugging of RUL bronchial stent noted on admission CT.    - continue 3% saline nebs q4h  - antibiotics as above  - RCAT consult for pulm toilet  - wean oxygen as able    COPD: No signs of acute exacerbation.  - continue PTA Anora-Ellipta, Duonebs qid    SIADH:  Na stable at baseline of 128.  - monitor daily     Atrial fibrillation.  Continue PTA metoprolol and diltiazem for rate control; Lovenox for anticoagulation.     Radiation-induced esophagitis.  Patient status post PEG tube placement.    - Continue Isosource 1.5, cans daily  - Nutrition to consult     Non-small cell lung cancer.  Patient is followed at the [facility].  Will defer consulting oncology service for now.    Chronic anemia.  Most recent hgb 2/26/18 was 8.5.    - hgb 7.4 on 3/3, monitor daily    # Pain Assessment:   Current Pain Score 3/3/2018 3/3/2018 3/3/2018   Patient currently in pain? sleeping: patient not able to self report denies denies   Haley bustillo pain level was assessed and she currently denies pain.      DVT Prophylaxis: Enoxaparin (Lovenox) SQ  Code Status: DNR    Disposition: Expected discharge in 3 days once respiratory failure resolved, transitioned to oral  "antibiotics.    Santos Downing    Interval History   Patient reports feeling \"like crap\" but has no specific complaints.  Feels dyspnea is slightly worse today.  No fever/chills, no chest pain/pressure, palpitations, lightheadedness.  No nausea or other complaints.    -Data reviewed today: I reviewed all new labs and imaging results over the last 24 hours. I personally reviewed no images or EKG's today.    Physical Exam   Temp: 98.2  F (36.8  C) Temp src: Axillary BP: 116/53   Heart Rate: 81 Resp: 21 SpO2: 98 % O2 Device: Nasal cannula Oxygen Delivery: 4 LPM  Vitals:    03/02/18 1946 03/03/18 0000   Weight: 64 kg (141 lb) 62.7 kg (138 lb 3.7 oz)     Vital Signs with Ranges  Temp:  [98.2  F (36.8  C)] 98.2  F (36.8  C)  Heart Rate:  [65-99] 81  Resp:  [18-24] 21  BP: (104-139)/(47-87) 116/53  SpO2:  [95 %-99 %] 98 %  I/O last 3 completed shifts:  In: 150   Out: 560 [Urine:560]    Constitutional: Well developed, well nourished female in no acute distress, appearing fatigued  Respiratory: Mild right upper lobe crackles with overall diminished right sided lung sounds, no wheezing, mild tachypnea  Cardiovascular: irregular rhythm, tachycardic, normal S1/S2 without murmur, rubs or gallops  GI: abdomen soft, non-tender, non-distended, normal bowel sounds  Lymph:  2+ pitting edema to knees bilaterally  Other:  alert and appropriate, cranial nerves grossly intact    Medications     - MEDICATION INSTRUCTIONS -       sodium chloride 100 mL/hr (03/03/18 0125)       piperacillin-tazobactam  4.5 g Intravenous Q6H     ipratropium - albuterol 0.5 mg/2.5 mg/3 mL  3 mL Nebulization Q4H     sodium chloride  3 mL Nebulization Q4H     diltiazem  90 mg Oral Q6H     enoxaparin  90 mg Subcutaneous Daily     pantoprazole  40 mg Per G Tube QAM AC     metoprolol tartrate (LOPRESSOR) tablet 25 mg  25 mg Per G Tube Q8H     PARoxetine (PAXIL) tablet 20 mg  20 mg Per G Tube Daily     umeclidinium-vilanterol  1 puff Inhalation Daily     vancomycin " "(VANCOCIN) IV  1,500 mg Intravenous Q12H     ipratropium - albuterol 0.5 mg/2.5 mg/3 mL  3 mL Nebulization Once       Data     Recent Labs  Lab 03/03/18  0425 03/02/18  1952   WBC 16.6* 14.9*   HGB 7.4* 8.1*   MCV 81 81    511*   * 127*   POTASSIUM 3.7 3.8   CHLORIDE 88* 86*   CO2 34* 35*   BUN 15 18   CR 0.51* 0.51*   ANIONGAP 6 6   SHEA 8.6 8.5   * 136*       Recent Results (from the past 24 hour(s))   Chest CT, IV contrast only - PE protocol    Narrative    CT CHEST PULMONARY EMBOLISM WITH CONTRAST   3/2/2018 8:55 PM     HISTORY: Lung cancer, stents, prior mucous plugging and rigid  bronchoscopy at [facility], \"can't breath\".      TECHNIQUE: 58 mL Isovue-370. Radiation dose for this scan was reduced  using automated exposure control, adjustment of the mA and/or kV  according to patient size, or iterative reconstruction technique.    COMPARISON: None.    FINDINGS: Large right hilar mass with stent in the right mainstem  bronchus. Stent in right upper lobe bronchus appears occluded.  Extensive right paratracheal and subcarinal adenopathy. Patchy  infiltrates or early small metastatic nodules in the right lower lobe  and mildly so on the left lower lobe. Small right pleural effusion.  Scans through the upper abdomen are unremarkable.    No left-sided pulmonary emboli. There are patent right upper lobe  pulmonary vessels without evidence of upper lobe pulmonary embolus.  The pulmonary artery on the right is occluded proximal to the branches  to the right middle lobe and right lower lobe likely as a result of  the large right hilar mass. No definite filling defects just absence  of contrast of the right middle and right lower lobe pulmonary  arteries.      Impression    IMPRESSION:  1. The right middle and lower lobe pulmonary arteries appear occluded  by a large right hilar mass. There are no specific filling defects,  but apparent occlusion just prior to the branching of the right middle  and right " lower lobe pulmonary artery branches. It would be difficult  to exclude pulmonary emboli here, but it has the appearance of  occlusion due to mass effect.  2. No left-sided pulmonary emboli.  3. Very prominent right paratracheal and subcarinal adenopathy.  4. Patchy infiltrates or early small metastatic nodules in the right  lower lobe and mildly so on the left lower lobe.  5. Small right pleural effusion.   6. Right mainstem bronchus stent appears patent extending toward the  right lower lobe. The right upper lobe bronchus stent appears  occluded.    DELROY PEREZ MD

## 2018-03-03 NOTE — PLAN OF CARE
Problem: Patient Care Overview  Goal: Plan of Care/Patient Progress Review  Outcome: No Change  End of Shift Nursing Summary  03/03/18 0700 to 1530  Neuro:  ROMERO. A & O X 4  Pain: abdominal pain around peg site, achy, 3/10. Oxycodone provided with relief.  CV:  SR with PACs and bursts of afib. PRN IV metoprolol given x1 for HR > 120.  Pulm: Remains on 4 L NC. Congested, productive cough. Lungs intermittently coarse on left side. Right lung fields diminished.   GI/: Alvarado with adequate urine output. No BM this shift.  Endocrine: BG <150  Skin: Rash on back/adbomen and blanchable erythema on coccyx.   Lines/drips: 2 PIV with NS infusion.     *See EPIC flowsheet for full nursing assessment findings    Jeanine Weems

## 2018-03-03 NOTE — CONSULTS
[facility] Intensive Care Unit  Consultation  March 2, 2018      Haley Mckeon MRN# [id number]   Age: 67 year old YOB: 1950     Date of Admission: 3/2/2018    Primary care provider: Rhys Terry            Assessment and Plan:     Haley Mckeon is a 67 year old female admitted on 3/2/2018 for   Chief Complaint   Patient presents with     Shortness of Breath     hx of lung ca treated at [facility], took a nap today and woke up feeling very SOB, has been feeling weak the last few days. next chemo is on monday.   . My assessment and plan for this patient is as follows:    SUMMARY: Haley Mckeon is a 67 year old lady with stage IV lung cancer who presents with SOB. She h/o airway stents x2. PE has been ruled out by Chest CT. Possible pna in Rt. LL and BI stent and Rt. UL stents    Neurology and Psychiatry:  1. Depression/Anxiety    Plan:   - cont home med regimen.    Pulmonary:  1. Acute on chronic hypoxic respiratory failure: Etiology is most likely combn of pna and mucous plugging of Rt. UL and BI.  2. H/o COPD: Will continue home inhaler regimen when able.     Plan:   - Will use BiPAP/O2 as needed.   - consider bronch to assess and remove secretions.   - Continue duoneb and HTS 3% 4ml BID.     - Consider trial of steroid burst with taper for possible COPD exacerbation.    Cardiovascular system:  1. Hypertension: BP well controlled.  2. H/o Afib with RVR: Metoprolol and dilt - primarily for this reason.    Plan:  - cont metoprolol and Diltiazem.    Renal/Electrolytes:  1. H/o SIADH (Last hospital stay): Na remains low. But Cl is low too. Most likely hypovolemia    Plan:   - Will gently hydrate and follow BMP    ID:  1. HCAP: Based on recent hospital stay.    Plan:  - Cont Zosyn/Vanc.  - Try to obtain sputum cultures, if bronch done send secretions for cultures.    GI/:   1. GERD: Sx well controlled. Cont PPI.  2. Nutrition:  Patient status post PEG tube placement.  Continue Isosource 1.5,  cans daily    Plan:   - Nutrition to be consulted in AM.    Musculoskeletal/Rheumatology:   1. No acute issues.    Plan:    Endocrine:  1. No acute issues    Plan:    Heme/Onc:  1. On Anticoag: Presumed due to recent h/o of Afib/RVR.    Plan:    ICU prophylaxis:      DVT; LMWH     VAP; As above     Restrain needed: No     Stress ulcer: On PPI     Central line: Needed today for IV access/Meds.    Code Status: DNR    Prognosis: Poor      Billing: L4    Gerard Mr Alexandra HOLLOWAY.           Treatment goals for next 24 hours:   1. Monitor overnight.  2. Plan for bronch in AM  3. Hold lovenox till post bronch.  4.  5.         History of Present Illness:     Haley Mckeon is a 67 year old lady who presents with shortness of breath. She has a history of lung cancer, and is normally treated at [facility], and has received 1 round of chemotherapy.          She has h/o non-small cell lung carcinoma and mediastinal invasion complete right mainstem bronchus obstruction left mainstem external compression was treated at the [facility].  Patient was recently hospitalized at [facility] in [place] from February 8-23, 2018. She was d/c'ed home on no oxygen.    She started feeling more SOB in the last few days and today developed generalized weakness and increasing shortness of breath.   reports that the patient is normally on 1lpm NC oxygen supplementation at home, but today her oxygen requirement increased to 4-5 L and patient was only able to get into the low 90 range of saturation.  She denies any fevers, chills, syncope, numbness, chest pain, or any other symptoms.           Mechanical Ventilation:     Day #   Resp: 21  Peak airway pressure:   Plateau airway pressure:   Auto-PEEP:            Lines:     PICC:    (placed on  )   Central venous line:  (placed on  )   Periph IV:  Vasc cath: (placed on )  Alvarado cath: (placed on )   ETT #  NGT: (placed on)   Feeding tube/Dobhoff: (placed on )   PEG: (placed on )    Tracheostomy: (placed on )   Chest tube: (placed on )          Feeding:     Enteral Feeding:  Yes  Route;  Gtube  Source;   Gastric residuals;     Parenteral feeding:   Route;   Day #    Blood glucose/Insulin requirement last 24 hours:              Allergies:   No Known Allergies             Past Medical History:     Past Medical History:   Diagnosis Date     Atrial fibrillation with RVR (H)      CAD (coronary artery disease)     CT coronary angiogram in 8/9/13 - Ca score of 355.4.      Cancer (H)     lung - NSCLC. Treated with Cisplatin/Etoposide from 2/12 to 2/14, XRT x5 days - 2/9 to 2/14/18.     Chewing tobacco nicotine dependence      Chronic hypercapnic respiratory failure (H)      Chronic obstructive pulmonary disease with severity to be determined (H)     FEV1 - 34%, %, DLCO 63%     GERD (gastroesophageal reflux disease)      H/O asbestos exposure      History of SIADH 02/2018     Hyperlipidemia      Hypertension      Major depressive disorder in remission      FINA on CPAP      Radiation-induced esophagitis      UTI (urinary tract infection) 12/2017    E. coli             Past Surgical History:     Past Surgical History:   Procedure Laterality Date     BRONCHOSCOPY (RIGID OR FLEXIBLE), DILATE BRONCHUS / TRACHEA  02/07/2018    Tumor debulking with endobronchial stent placement to Rt. Mainstem bronchus, BI and Left main stem bronchus.             Social History:     Social History     Social History     Marital status:      Spouse name: N/A     Number of children: N/A     Years of education: N/A     Occupational History     Not on file.     Social History Main Topics     Smoking status: Former Smoker     Smokeless tobacco: Not on file     Alcohol use No     Drug use: No     Sexual activity: No     Other Topics Concern     Not on file     Social History Narrative         Smoking:   Social History   Substance Use Topics     Smoking status: Former Smoker     Smokeless tobacco: Not on file      Alcohol use No              Family History:   No family history on file.             Medications:     Current Facility-Administered Medications   Medication     piperacillin-tazobactam (ZOSYN) 4.5 g vial to attach to  mL bag     naloxone (NARCAN) injection 0.1-0.4 mg     ipratropium - albuterol 0.5 mg/2.5 mg/3 mL (DUONEB) neb solution 3 mL     Patient is already receiving anticoagulation with heparin, enoxaparin (LOVENOX), warfarin (COUMADIN)  or other anticoagulant medication     sodium chloride 0.9% infusion     ondansetron (ZOFRAN-ODT) ODT tab 4 mg    Or     ondansetron (ZOFRAN) injection 4 mg     prochlorperazine (COMPAZINE) injection 5 mg    Or     prochlorperazine (COMPAZINE) tablet 5 mg    Or     prochlorperazine (COMPAZINE) Suppository 12.5 mg     potassium chloride SA (K-DUR/KLOR-CON M) CR tablet 20-40 mEq     potassium chloride (KLOR-CON) Packet 20-40 mEq     potassium chloride 10 mEq in 100 mL sterile water intermittent infusion (premix)     potassium chloride 10 mEq in 100 mL intermittent infusion with 10 mg lidocaine     potassium chloride 20 mEq in 50 mL intermittent infusion     magnesium sulfate 4 g in 100 mL sterile water (premade)     sodium chloride 3 % neb solution 3 mL     acetaminophen (TYLENOL) solution 975 mg     alum & mag hydroxide-simethicone (MYLANTA ES/MAALOX  ES) suspension 20 mL     diltiazem (CARDIZEM) tablet 90 mg     enoxaparin (LOVENOX) injection 90 mg     pantoprazole (PROTONIX) suspension 40 mg     metoprolol tartrate (LOPRESSOR) tablet 25 mg     oxyCODONE (ROXICODONE) solution 5 mg     PARoxetine (PAXIL) tablet 20 mg     umeclidinium-vilanterol (ANORO ELLIPTA) 62.5-25 MCG/INH oral inhaler 1 puff     guaiFENesin (ROBITUSSIN) 20 mg/mL solution 15 mL     ipratropium - albuterol 0.5 mg/2.5 mg/3 mL (DUONEB) neb solution 3 mL     sodium chloride 3 % neb solution 3 mL               Review of Systems:     General: no fever, chills, weakness  HEENT: No loss of hearing, vertigo, ear  "ringing, double/blurry vision, sore throat, epistaxis  Pulmonary: see HPI.  Sleep: No EDS, snoring, insomnia, cataplexy, restless legs, REM behavior   CVS: No chest discomfort, palpitations  GI: No diarrhea, constipation, dysphagia, early satiety, bleeding  Renal: No pain on urination, hematuria, freq micturation  Musculoskeletal: No muscle ache, joint pain, swelling  Skin: No rash, ecchymosis, itching, icterus  Neurology: No tingling, weakness, numbness  Heme: No easy bruisibility or bleeding  Allergy: runny nose, sneezing, urticeria  Psychiatry: no change in mood and affect         Physical Examination:   General: Oriented x3, on BiPAP.  Vitals: /47  Temp 98.2  F (36.8  C) (Axillary)  Resp 21  Ht 1.676 m (5' 6\")  Wt 62.7 kg (138 lb 3.7 oz)  SpO2 99%  BMI 22.31 kg/m2   SpO2: 96 %    HEENT: neck supple, symmetrical, no lymph node enlargement, thyromegaly, bruit, JVD, pupils are isocoric and equally responsive to the light. Mallampati score, Grade II  Lungs: both hemithoraces are symmetrical, normal to palpation, no dullness to percussion, auscultation of lungs revealed decreased bronchovesicular sounds in the Rt lung especially upper lung zone, expirium prolongation, wheezing, rhonci and crackles  CVS: Normal S1 and S2, no additional heart sounds, murmur, rub, normal peripheral pulses  Abdomen: Bowel sounds present, soft, non tender, non distended, no organomegaly, ascitis, mass  Extremities/musculoskeletal: no peripheral edema, deformity, cyanosis, clubbing  Neurology: alert, orientedx3, no motor/sensorial deficits, cranial nerves grossly normal  Skin: no rash  Psychiatry: Mood and affect are appropriate       Exam of Line sites: No erythema or discharge.          Labs:     CBC RESULTS:       Recent Labs  Lab 03/02/18 1952   WBC 14.9*   RBC 3.15*   HGB 8.1*   HCT 25.6*   *       Basic Metabolic Panel:    Recent Labs  Lab 03/02/18 1952   *   POTASSIUM 3.8   CHLORIDE 86*   CO2 35*   BUN 18 "   CR 0.51*   *   SHEA 8.5       INRNo lab results found in last 7 days.         Gerard Morris

## 2018-03-03 NOTE — PROVIDER NOTIFICATION
Dr. Downing notified of patient rash on lower back and abdomen. MD assessed at bedside. Pt denies new home medications, pain or itchiness associated with. No change in plan of care, continue to monitor.

## 2018-03-03 NOTE — PHARMACY-ADMISSION MEDICATION HISTORY
Admission Medication History    Admission medication history interview status for the 3/2/2018 admission is complete. See EPIC admission navigator for prior to admission medications     Medication history source reliability:Good    Actions taken by pharmacist (provider contacted, etc):None     Additional medication history information not noted on PTA med list :None    Medication reconciliation/reorder completed by provider prior to medication history? No    Time spent in this activity: 10 minutes    Prior to Admission medications    Medication Sig Last Dose Taking? Auth Provider   acetaminophen (TYLENOL) 32 mg/mL solution Take 960 mg by mouth every 6 hours as needed for fever or mild pain 2/27/2018 at PRN Yes Unknown, Entered By History   albuterol (PROAIR HFA/PROVENTIL HFA/VENTOLIN HFA) 108 (90 BASE) MCG/ACT Inhaler Inhale 1-2 puffs into the lungs every 4 hours as needed for shortness of breath / dyspnea or wheezing  at PRN Yes Unknown, Entered By History   alum & mag hydroxide-simethicone (MYLANTA/MAALOX) 200-200-20 MG/5ML SUSP suspension 30 mLs by Gastric Tube route every 4 hours as needed for indigestion  Yes Unknown, Entered By History   umeclidinium-vilanterol (ANORO ELLIPTA) 62.5-25 MCG/INH oral inhaler Inhale 1 puff into the lungs daily 3/2/2018 at Unknown time Yes Unknown, Entered By History   DILTIAZEM HCL PO 90 mg by Gastric Tube route every 6 hours 3/2/2018 at x2 Yes Unknown, Entered By History   ENOXAPARIN SODIUM SC Inject 90 mg Subcutaneous daily 3/2/2018 at Unknown time Yes Unknown, Entered By History   LANsoprazole (PREVACID) 3 mg/mL SUSP 30 mg by Gastric Tube route every morning (before breakfast) 3/2/2018 at Unknown time Yes Unknown, Entered By History   DPH-Lido-AlHydr-MgHydr-Simeth (FIRST-MOUTHWASH BLM MT) Take 15 mLs by mouth 4 times daily 891--40/30 mL 3/2/2018 at x2 Yes Unknown, Entered By History   ipratropium (ATROVENT) 0.02 % neb solution Take 0.5 mg by nebulization 4 times daily  3/2/2018 at x2 Yes Unknown, Entered By History   METOPROLOL TARTRATE PO Take 25 mg by mouth every 8 hours 3/2/2018 at x2 Yes Unknown, Entered By History   nystatin (MYCOSTATIN) 938441 UNIT/ML suspension Take 500,000 Units by mouth 4 times daily 3/2/2018 at x2 Yes Unknown, Entered By History   oxyCODONE (ROXICODONE) 5 MG/5ML solution Take 5 mg by mouth every 3 hours as needed for moderate to severe pain 3/1/2018 at Unknown time Yes Unknown, Entered By History   sodium chloride 0.9 % neb solution Take 3 mLs by nebulization 4 times daily 3/2/2018 at x2 Yes Unknown, Entered By History   PAROXETINE HCL PO Take 20 mg by mouth daily 3/2/2018 at Unknown time Yes Unknown, Entered By History   senna-docusate (SENOKOT-S;PERICOLACE) 8.6-50 MG per tablet Take 1 tablet by mouth daily as needed for constipation  Yes Unknown, Entered By History       Gerson Madrigal, PharmD

## 2018-03-03 NOTE — CONSULTS
"CLINICAL NUTRITION SERVICES  -  ASSESSMENT NOTE      Recommendations Ordered by Registered Dietitian (RD):   Free H20 30 mL before and after TID bolus feedings   Future/Additional Recommendations:   Will monitor the need to increase TF volume as appropriate, as current regimen meeting lower end of needs.   Malnutrition:   % Weight Loss:  > 5% in 1 month (severe malnutrition)  % Intake:  </= 50% for >/= 1 month (severe malnutrition)  Subcutaneous Fat Loss:  Orbital region mild depletion and Upper arm region mild depletion  Muscle Loss:  Temporal region mild depletion and Clavicle bone region mild depletion  Fluid Retention:  Mild - Could be partly nutrition related    Malnutrition Diagnosis: Severe malnutrition  In Context of:  Acute illness or injury        REASON FOR ASSESSMENT  Haley Mckeon is a 67 year old female seen by Registered Dietitian for Admission Nutrition Risk Screen - TF or Parenteral Nutrition and Provider Order - Registered Dietitian to Assess and Order TF per Medical Nutrition protocol      NUTRITION HISTORY  - Information obtained from patient and EPIC records.  - Patient is on a diet as tolerated (no restrictions per pt) at home.  - Typical food/fluid intake is poor.  - Diet history:  Pt reports that lately she has consumed a piece of licorice and a piece of cheese.  Oral intake has been poor.     - Tube feeding history:  PEG FT was placed on 18.  Pt has been infusing 4 cans Isosource 1.5 per day given in 3 feedings (1.5 cans in am, 1.5 cans at noon, and 1 can at HS) providin kcals (24 kcal/kg and 96% energy needs), 68 gm pro (1.1 gm/kg and 91% pro needs), 760 mL H20, 15 gm fiber, 176 gm CHO.  The H20 flushes were minimal (1/2 syringe before and after feedings - assume 30 mL).  She said they were restricting fluid because of \"that thing going on.\"   - Supplements:  Pt tried drinking Boost at one time but she said it made her sick to her stomach so she quit.   - No food " "allergies.  Pt has lost the weight \"all over\" and feels weak and deconditioned.    CURRENT NUTRITION ORDERS  Diet Order:     NPO   Nutrition Support Enteral:  Type of Feeding Tube:  PEG  Enteral Frequency:  Continuous  Enteral Regimen:  4 cans Isosource 1.5 given in 3 feedings per day  Total Enteral Provisions:  1500 kcals (24 kcal/kg and 96% energy needs), 68 gm pro (1.1 gm/kg and 91% protein needs), 760 mL H20, 15 gm fiber, 176 gm CHO  Free Water Flush:  None ordered    Current Intake/Tolerance:  Pt reports that she hasn't received her bolus feeding yet today.  She prefers to continue the bolus feeding schedule as that's what she's used to (vs change to continuous TF while in ICU).    PHYSICAL FINDINGS  Observed  Muscle Wasting - clavicle, temporal   Subcutaneous fat loss - orbital, arms  Obtained from Chart/Interdisciplinary Team  Edema - 2+ pitting to knees bilaterally    ANTHROPOMETRICS  Height: 5' 6\"  Weight: 138 lbs 3.65 oz  Body mass index is 22.31 kg/(m^2).  Weight Status:  Normal BMI  IBW:  61.3 kg  % IBW:  102%  Weight History:  Usual weight 157-160 lbs (January of 2018).  Thus 22 lb (14%) weight loss over past 2 months.    LABS  Labs reviewed  Na 128 (L) - Pt with h/o SIADH    MEDICATIONS  Medications reviewed  IVF NaCl at 100 mL/hr    Dosing Weight:  62.7 kg (current wt)    ASSESSED NUTRITION NEEDS PER APPROVED PRACTICE GUIDELINES:  Estimated Energy Needs:  0015-8535 kcals (25-30 Kcal/Kg)  Justification: maintenance  Estimated Protein Needs:  75-95 grams protein (1.2-1.5 g pro/Kg)  Justification: Repletion and hypercatabolism with acute illness  Estimated Fluid Needs:  2904-5147 mL (1 mL/Kcal)  Justification: maintenance and per provider pending fluid status    MALNUTRITION:  % Weight Loss:  > 5% in 1 month (severe malnutrition)  % Intake:  </= 50% for >/= 1 month (severe malnutrition)  Subcutaneous Fat Loss:  Orbital region mild depletion and Upper arm region mild depletion  Muscle Loss:  Temporal " region mild depletion and Clavicle bone region mild depletion  Fluid Retention:  Mild - Could be partly nutrition related    Malnutrition Diagnosis: Severe malnutrition  In Context of:  Acute illness or injury    NUTRITION DIAGNOSIS:  Unintended weight loss related to decreased appetite, early satiety, and impaired respiratory function as evidenced by reported 14% weight loss over the past 2 months, minimal oral intake, and reliance on bolus TF for nutrition    NUTRITION INTERVENTIONS  Recommendations / Nutrition Prescription  Continue TF bolus TF schedule as outlined above.    Free H20 30 mL before and after TID bolus feedings    Will monitor the need to increase TF volume as appropriate, as current regimen meeting lower end of needs.      Implementation  Nutrition education: Not appropriate at this time due to patient condition  Feeding Tube Flush - Ordered minimal H20 flushes in EPIC for now    Nutrition Goals  Bolus TF TID with 4 cans Isosource 1.5 daily will meet % estimated needs.      MONITORING AND EVALUATION:  Progress towards goals will be monitored and evaluated per protocol and Practice Guidelines    Cheryl Hanson, RD, LD, [facility]

## 2018-03-03 NOTE — ED NOTES
"[facility]  ED Nurse Handoff Report    ED Chief complaint: Shortness of Breath (hx of lung ca treated at [facility], took a nap today and woke up feeling very SOB, has been feeling weak the last few days. next chemo is on monday.)      ED Diagnosis:   Final diagnoses:   Pneumonia of both lower lobes due to infectious organism   Malignant neoplasm of right lung, unspecified part of lung (H)       Code Status: Per hospitalist    Allergies: No Known Allergies    Activity level - Baseline/Home:  Independent    Activity Level - Current:   Stand with Assist     Needed?: No    Isolation: No  Infection: Not Applicable    Bariatric?: No    Vital Signs:   Vitals:    03/02/18 1946   BP: 123/60   Resp: 22   Temp: 98.2  F (36.8  C)   TempSrc: Oral   SpO2: 96%   Weight: 64 kg (141 lb)   Height: 1.702 m (5' 7\")       Cardiac Rhythm: ,        Pain level:      Is this patient confused?: No     Patient Report: Initial Complaint: Pt w/ hx lung ca comes in with sudden worsening shortness of breath after getting up from a nap. At baseline pt is on 1-2L NC at home here requires 3L for sats in mid 90's. Last chemo treatment was last month and is due for chemo on Monday. Being seen at [facility].  Focused Assessment: Resp: Tachy, SOB, coarse lung sounds. Cardiac: tachy, edema to lower extremities, Neuro: WNL  Tests Performed: basic labs, blood cultures, chest ct, flu swab, procalcitonin, d-dimer  Abnormal Results:   Results for orders placed or performed during the hospital encounter of 03/02/18 (from the past 24 hour(s))   CBC with platelets differential   Result Value Ref Range    WBC 14.9 (H) 4.0 - 11.0 10e9/L    RBC Count 3.15 (L) 3.8 - 5.2 10e12/L    Hemoglobin 8.1 (L) 11.7 - 15.7 g/dL    Hematocrit 25.6 (L) 35.0 - 47.0 %    MCV 81 78 - 100 fl    MCH 25.7 (L) 26.5 - 33.0 pg    MCHC 31.6 31.5 - 36.5 g/dL    RDW 17.1 (H) 10.0 - 15.0 %    Platelet Count 511 (H) 150 - 450 10e9/L    Diff Method Automated Method     % " "Neutrophils 86.8 %    % Lymphocytes 6.0 %    % Monocytes 5.6 %    % Eosinophils 0.1 %    % Basophils 0.2 %    % Immature Granulocytes 1.3 %    Nucleated RBCs 0 0 /100    Absolute Neutrophil 12.9 (H) 1.6 - 8.3 10e9/L    Absolute Lymphocytes 0.9 0.8 - 5.3 10e9/L    Absolute Monocytes 0.8 0.0 - 1.3 10e9/L    Absolute Eosinophils 0.0 0.0 - 0.7 10e9/L    Absolute Basophils 0.0 0.0 - 0.2 10e9/L    Abs Immature Granulocytes 0.2 0 - 0.4 10e9/L    Absolute Nucleated RBC 0.0    Basic metabolic panel   Result Value Ref Range    Sodium 127 (L) 133 - 144 mmol/L    Potassium 3.8 3.4 - 5.3 mmol/L    Chloride 86 (L) 94 - 109 mmol/L    Carbon Dioxide 35 (H) 20 - 32 mmol/L    Anion Gap 6 3 - 14 mmol/L    Glucose 136 (H) 70 - 99 mg/dL    Urea Nitrogen 18 7 - 30 mg/dL    Creatinine 0.51 (L) 0.52 - 1.04 mg/dL    GFR Estimate >90 >60 mL/min/1.7m2    GFR Estimate If Black >90 >60 mL/min/1.7m2    Calcium 8.5 8.5 - 10.1 mg/dL   Procalcitonin   Result Value Ref Range    Procalcitonin 0.56 ng/ml   D dimer quantitative   Result Value Ref Range    D Dimer 3.2 (H) 0.0 - 0.50 ug/ml FEU   Influenza A/B antigen   Result Value Ref Range    Influenza A/B Agn Specimen Nares     Influenza A Negative NEG^Negative    Influenza B Negative NEG^Negative   Chest CT, IV contrast only - PE protocol    Narrative    CT CHEST PULMONARY EMBOLISM WITH CONTRAST   3/2/2018 8:55 PM     HISTORY: Lung cancer, stents, prior mucous plugging and rigid  bronchoscopy at [place], \"can't breath\".      TECHNIQUE: 58 mL Isovue-370. Radiation dose for this scan was reduced  using automated exposure control, adjustment of the mA and/or kV  according to patient size, or iterative reconstruction technique.    COMPARISON: None.    FINDINGS: Large right hilar mass with stent in the right mainstem  bronchus. Stent in right upper lobe bronchus appears occluded.  Extensive right paratracheal and subcarinal adenopathy. Patchy  infiltrates or early small metastatic nodules in the right lower " lobe  and mildly so on the left lower lobe. Small right pleural effusion.  Scans through the upper abdomen are unremarkable.    No left-sided pulmonary emboli. There are patent right upper lobe  pulmonary vessels without evidence of upper lobe pulmonary embolus.  The pulmonary artery on the right is occluded proximal to the branches  to the right middle lobe and right lower lobe likely as a result of  the large right hilar mass. No definite filling defects just absence  of contrast of the right middle and right lower lobe pulmonary  arteries.      Impression    IMPRESSION:  1. The right middle and lower lobe pulmonary arteries appear occluded  by a large right hilar mass. There are no specific filling defects,  but apparent occlusion just prior to the branching of the right middle  and right lower lobe pulmonary artery branches. It would be difficult  to exclude pulmonary emboli here, but it has the appearance of  occlusion due to mass effect.  2. No left-sided pulmonary emboli.  3. Very prominent right paratracheal and subcarinal adenopathy.  4. Patchy infiltrates or early small metastatic nodules in the right  lower lobe and mildly so on the left lower lobe.  5. Small right pleural effusion.   6. Right mainstem bronchus stent appears patent extending toward the  right lower lobe. The right upper lobe bronchus stent appears  occluded.    DELROY PEREZ MD       Treatments provided: neb, declined bipap as she is claustrophobic     Family Comments:  at bedside    OBS brochure/video discussed/provided to patient: N/A    ED Medications:   Medications   piperacillin-tazobactam (ZOSYN) 4.5 g vial to attach to  mL bag (not administered)   ipratropium - albuterol 0.5 mg/2.5 mg/3 mL (DUONEB) neb solution 3 mL (3 mLs Nebulization Given 3/2/18 2033)   Saline Flush - CT (85 mLs Intravenous Given 3/2/18 2049)   iopamidol (ISOVUE-370) solution 58 mL (58 mLs Intravenous Given 3/2/18 2049)       Drips infusing?:   No    For the majority of the shift this patient was [place].   Interventions performed were none.    Severe Sepsis OR Septic Shock Diagnosis Present: No      ED NURSE PHONE NUMBER: [phone]

## 2018-03-03 NOTE — PLAN OF CARE
Problem: Patient Care Overview  Goal: Plan of Care/Patient Progress Review  Outcome: Improving  pts admitted from ED chief complaint for sob, fatigue, weakness and  hard to breath. Vital signs stable.SR on tele monitor and with hx of Afib. pts has hx of lung CA and is being treated at [facility], has received 1 round of chemotherapy and scheduled for another round next Monday. CT of the chest demonstrates occlusion of the right upper lobe likely mucus plugging. Admitted to ICU for possible bronchoscopy. pneumonia started on zosyn and vancomycin. pts placed on 4L NC.At baseline pt is on 1-2L NC at home. pts declined bipap as she is very claustrophobic. pts has Peg tube on RUQ and floyd for retention. Will continue to monitor .

## 2018-03-03 NOTE — ED PROVIDER NOTES
"  History     Chief Complaint:  Shortness of Breath    HPI   Haley Mckeon is a 67 year old female who presents with shortness of breath.  Patient has a history of lung cancer, and [facility], and has received 1 round of chemotherapy.  She states she was released from the hospital last week and today has developed generalized weakness and increasing shortness of breath.   reports that the patient is normally on oxygen supplementation at home, but today her oxygen was increased to 4-5 L and patient was only able to get into the low 90 range of saturation.  On presentation, patient states that she is fatigued and short of breath, but denies any fevers, chills, syncope, numbness, chest pain, or any other symptoms.    Allergies:  No known drug allergies.    Medications:    The patient is not currently taking any prescribed medications.     Past Medical History:    Lung Cancer    Past Surgical History:    Pulmonary Stent placement    Family History:    No pertinent family history.    Social History:  Smoking status: Former smoker  Alcohol use: No  Marital Status:  Single     Review of Systems   Constitutional: Positive for fatigue. Negative for fever.   Eyes: Negative for visual disturbance.   Respiratory: Positive for shortness of breath. Negative for cough.    Cardiovascular: Negative for chest pain.   Neurological: Positive for weakness. Negative for dizziness, numbness and headaches.   All other systems reviewed and are negative.    Physical Exam     Patient Vitals for the past 24 hrs:   BP Temp Temp src Heart Rate Resp SpO2 Height Weight   03/02/18 1946 123/60 98.2  F (36.8  C) Oral 98 22 96 % 1.702 m (5' 7\") 64 kg (141 lb)         Physical Exam  GENERAL: well developed, pleasant. Looks uncomfortable.  HEAD: atraumatic  EYES: pupils reactive, extraocular muscles intact, conjunctivae normal  ENT:  mucus membranes moist  NECK:  trachea midline, normal range of motion  RESPIRATORY: Coarse " breath sounds bilaterally. Occasional wheeze  CVS: normal S1/S2, no murmurs, intact distal pulses  ABDOMEN: soft, nontender, nondistention  MUSCULOSKELETAL: no deformities  SKIN: warm and dry, no acute rashes or ulceration  NEURO: GCS 15, cranial nerves intact, alert and oriented x3  PSYCH:  Mood/affect normal    Emergency Department Course   Imaging:  Chest CT with IV Contrast:   IMPRESSION:  1. The right middle and lower lobe pulmonary arteries appear occluded by a large right hilar mass. There are no specific filling defects, but apparent occlusion just prior to the branching of the right middle and right lower lobe pulmonary artery branches. It would be difficult to exclude pulmonary emboli here, but it has the appearance of occlusion due to mass effect.  2. No left-sided pulmonary emboli.  3. Very prominent right paratracheal and subcarinal adenopathy.  4. Patchy infiltrates or early small metastatic nodules in the right lower lobe and mildly so on the left lower lobe.  5. Small right pleural effusion.   6. Right mainstem bronchus stent appears patent extending toward the right lower lobe. The right upper lobe bronchus stent appears occluded.  Report per radiology.       Laboratory:  CBC:  WBC 14.9 (H), HGB 8.1 (L),  (H),  BMP:  (L), Chloride 86 (L), Carbon Dioxide 35 (H), Glucose 136 (H), Creatinine 0.51 (L), otherwise WNL  (1952) D Dimer: 3.2 (H)  (1952) Procalcitonin: 0.56  Blood culture #1: pending  Blood culture #2: pending    Interventions:  (2033) Albuterol-Ipratropium inhalation solution, 2.5 mg-0.5 mg/3 ml; 3 mL, inhalation  (2306) Zosyn infusion 4.5g, IV infusion  (2315) Sodium chloride 3% neb solution, 3 mL    Emergency Department Course:  Nursing notes and vitals reviewed.  (2002) I performed an exam of the patient as documented above.    The patient was sent for a CT scan while in the emergency department, findings above.   Blood was drawn from the patient. This was sent for laboratory  testing, findings above.   The patient's nose was swabbed and this sample was sent for influenza screen, findings above.     (2249) I spoke with Dr. Cota about the patient and her current status, admission was recommended at this time to ICU.    Findings and plan explained to the patient and family who consents to admission.   (4230) I discussed the patient with Dr. Dupree of the hospitalist service, who will admit the patient to a ICU bed for further monitoring, evaluation, and treatment.  Impression & Plan    Medical Decision Making:  Haley Mckeon is a 67 year old female who presents with worsening shortness of breath in the setting of lung cancer with stents, mucus plugging after stents have been placed with bronchoscopy to remove plugging, and COPD. Certainly pneumonia, mucus plug, pulmonary embolism, or cancer are all considered. Repeat CT shows findings as above, most notably for mucus plugging in the right upper lobe, most likely indicating metastatic disease versus pneumonia. White count is slightly elevated and procalcitonin is suggesting antibiotics. Given recent hospitalization, patient was given Zosyn after blood cultures were obtained. Went in to talk to the patient about the findings and patient is tripoding, not knowing how much longer she can do this. Patient was ordered bi-pap but refused stating she is too claustrophobic with it. Patient was given Duo Neb with 3% normal saline neb to help out with any mucus plugging and to loosen up secretions. She is now resting more comfortably and does not look to be requiring intubation. Did speak with Dr. Dupree with the hospitalist group as well as Dr. Cota from the intensivist as well as component of pulmonology if possible bronchoscopy. Patient will be admitted to the ICU with ongoing care. Discussed code status with them and at this time is full code.     Diagnosis:    ICD-10-CM   1. Pneumonia of both lower lobes due to infectious organism J18.9   2.  Malignant neoplasm of right lung, unspecified part of lung (H) C34.91       Disposition:  Patient is admitted to a ICU bed under the care of Dr. Dupree.              I, Luigi Fofana, am serving as a scribe on 3/2/2018 at 8:02 PM to personally document services performed by Dr. Ramos based on my observations and the provider's statements to me.       Luigi Fofana  3/2/2018    EMERGENCY DEPARTMENT       Wolfgang Ramos MD  03/03/18 0019

## 2018-03-04 NOTE — PROGRESS NOTES
[facility]    Sepsis Evaluation Progress Note    Date of Service: 03/04/2018    I was called to see Haley Mckeon due to abnormal vital signs triggering the Sepsis SIRS screening alert. She is known to have an infection.     Physical Exam    Vital Signs:  Temp: 98  F (36.7  C) Temp src: Oral BP: 115/61 Pulse: 90 Heart Rate: 87 Resp: 22 SpO2: 95 % O2 Device: Nasal cannula Oxygen Delivery: 4 LPM    Lab:  Lactic Acid   Date Value Ref Range Status   03/04/2018 2.3 (H) 0.7 - 2.0 mmol/L Final       The patient is at baseline mental status.    The rest of their physical exam is significant for mildly coarse lung sounds with diminished right-sided sounds.    Assessment and Plan    The SIRS and exam findings are likely due to   sepsis.     ID: The patient is currently on the following antibiotics:  Anti-infectives (Future)    Start     Dose/Rate Route Frequency Ordered Stop    03/04/18 1500  meropenem (MERREM) 1 g vial to attach to  mL bag     Comments:  Pharmacy can adjust dose based on renal function.    1 g  over 30 Minutes Intravenous EVERY 8 HOURS 03/04/18 1445          Current antibiotic coverage will be switched from zosyn to meropenm.    Fluid: Fluid bolus ordered.    Lab: Repeat lactic acid ordered for 2 hours from now.    Disposition: The patient will remain on the current unit. We will continue to monitor this patient closely.    Santos Downing MD

## 2018-03-04 NOTE — PLAN OF CARE
Problem: Patient Care Overview  Goal: Plan of Care/Patient Progress Review  Outcome: Therapy, progress towards functional goals is fair  Neuro: Anxious at times, especially with activity or stimulation; frustrated at illness and hospitalization. Alert and oriented.   Resp: Stable on NC; very loose productive cough  CV: Stable  GI: Started patient back on her home TF regimine; tolerating well  : Good UO  Patient c/o back pain, heat pad in place and prn oxycodone effective. Patient refuses cares at times, especially repositioning, but is pleasant and thankful, wants to go home.

## 2018-03-04 NOTE — PROGRESS NOTES
"Brief Progress/consult note    S: pt is a 68 yo woman with NSCLC admitted with acute on chronic hypoxic respiratory failure. Has been treated at [facility], had stents placed into RU and R mainstem. CT notable for occlusion of the RUL stent, but R mainstem appears patent. Pt thinks she has been on O2 since December, has neb at home but doesn't think it is as helpful as the one here. Has been using flutter valve and is able to expectorate. Has been fatigued, has been very unclear on the details of recent treatment.     O:   /57  Temp 98.2  F (36.8  C) (Oral)  Resp 16  Ht 1.676 m (5' 6\")  Wt 62.7 kg (138 lb 3.7 oz)  SpO2 98%  BMI 22.31 kg/m2  Gen: appears stated age, mild distress  HEENT: NC in place. Eyes PERRL. Oral mucosa dry.  Resp: tachypneic. No retractions but using pursed lip breathing. Coarse breath sounds bilaterally.   CV: rrr. No MRG. 2+ LE edema bilaterally  Skin: no rash  Neuro: mentation intact, speech fluent, CN intact bilaterally.    Labs reviewed. Persistent hyponatremia. Renal function OK. Leukocytosis and anemia.    A/P:    68 yo woman with NSCLC admitted with acute on chronic hypoxic respiratory failure.    1) HCAP: recent admission at [facility] in [place], now with consolidations on CXR- likely infection but could also represent disease progression. Agree with pip/tazo.    2) NSCLC: has had 2 stents placed in airways; R upper lobe stent appears occluded; not clear if this is chronic or not. Right mainstem stent is widely patent, much of RML and RLL open and participating in gas exchange except for scattered consolidations. Would consider discussion with [facility] pulm interventionalists vs [facility] interventionalists- not clear that she would benefit from stent exchange or debulking at this point. Pt thinks she is scheduled for chemo on Monday- would ideally discuss with [facility] onc. Agree with aggressive airway clearance, hypertonic saline, flutter valve.    3) afib with rvr, complicated last 2 " hospitalizations. Well controlled on metop and dilt today.    4) SIADH, sodium stable. Fluid restriction.     5) COPD: on umeclidinium/vilanterol. No indication for steroids at this point. Continue scheduled albuterol/ipratropium.

## 2018-03-04 NOTE — PROGRESS NOTES
[facility]    Hospitalist Progress Note      Assessment & Plan   Haley Mckeon is a 67 year old female who was admitted on 3/2/2018. Past history of non-small cell lung cancer s/p bronchial stenting, A-fib, SIADH, COPD, radiation-induced esophagitis s/p PEG placement who presents with progressive SOB.    Healthcare associated pneumonia.  Admission CT chest with patchy infiltrate vs early metastatic disease of RLL and LLL.  Leukocytosis of 14.9 at admission, no other SIRS criteria present.  - continue zosyn, will discontinue vanco as cultures remain negative  - sputum and blood cultures ngtd  - leukocytosis trending up but remains afebrile, will repeat procalcitonin  ADDENDUM:  Paged regarding increased shortness of breath this afternoon.  Patient denies any chest pain/pressure, is on therapeutic lovenox.  Lung sounds are unchanged, but procalcitonin is up from admission.  Cultures remain ngtd.  Will switch zosyn to meropenem, repeat CT chest, check Pro-BNP as received IVF for ANGELO (but would not give lasix at this time as lactate just returned elevated).  Giving 500 NS now with repeat lactate in 2 hours.  ADDENDUM:  CT chest shows new mucous plugging of right lower lobe bronchiolar stent.  Discussed with Intensivist who agrees that chest physiotherapy is next best step - patient now agreeable.  Intensivist also recommends diuretic despite elevated lactate (as is quite minimal) given elevated BNP and slight increase in pleural effusion compared to prior.  Bolus discontinued and ordered 40 mg IV lasix x1.     Acute hypoxic respiratory failure.  Likely due to combination of healthcare associated pneumonia and mucous plugging of RUL bronchial stent noted on admission CT.    - continue 3% saline nebs q4h  - antibiotics as above  - RCAT consult for pulm toilet  - wean oxygen as able  - Intensivist input appreciated, no plan for bronch at this time    ANGELO:  Baseline Cr 0.5, up to 1.04 on 3/4.  Possibly  contrast-induced nephropathy vs vancomycin.  Occasional blood pressures of 90's systolic but no significant hypotension.    - check UA and urine sodium  - 1L NS   - monitor daily BMP    Pre-diabetes:  New diagnosis.  A1C 6.3% this admission.  - SSI    Hypokalemia, hypomagnesemia:  - replace per protocol    COPD: No signs of acute exacerbation.  - continue PTA Anora-Ellipta, Duonebs qid    SIADH:  Recent Na values about 130.  - monitor daily, Na up to 137 on 3/4     Atrial fibrillation.  Continue PTA metoprolol and diltiazem for rate control; Lovenox for anticoagulation.     Radiation-induced esophagitis.  Patient status post PEG tube placement.    - Nutrition following for TF management     Non-small cell lung cancer.  Patient is followed at the [facility].  Will defer consulting oncology service for now.  - believes she is due to chemo on 3/5, will attempt to contact [place] in AM    Chronic anemia.  Most recent hgb 2/26/18 was 8.5.    - hgb stable 7-8 g/dL on 3/4    # Pain Assessment:   Current Pain Score 3/4/2018 3/4/2018 3/4/2018   Patient currently in pain? denies yes denies   Pain score (0-10) - - -   Pain location - Back -   Pain descriptors - Aching -   Haley s pain level was assessed and she currently denies pain.      DVT Prophylaxis: Enoxaparin (Lovenox) SQ  Code Status: DNR    Disposition: Expected discharge pending improvement in dyspnea, ANGELO, signs of resolving infection.  ?3 days.    Santos Downing    Interval History   Patient reports feeling generally better today but reports ongoing dyspnea.  No significant wheezing. Is producing sputum with acapella.  No fever/chills.  No nausea or diarrhea.    -Data reviewed today: I reviewed all new labs and imaging results over the last 24 hours. I personally reviewed no images or EKG's today.    Physical Exam   Temp: 97.6  F (36.4  C) Temp src: Axillary BP: 100/64   Heart Rate: 79 Resp: 18 SpO2: 94 % O2 Device: Nasal cannula Oxygen Delivery: 4 LPM  Vitals:     03/02/18 1946 03/03/18 0000 03/04/18 0400   Weight: 64 kg (141 lb) 62.7 kg (138 lb 3.7 oz) 64.2 kg (141 lb 8.6 oz)     Vital Signs with Ranges  Temp:  [97.6  F (36.4  C)-98.2  F (36.8  C)] 97.6  F (36.4  C)  Heart Rate:  [] 79  Resp:  [12-25] 18  BP: ()/(50-85) 100/64  SpO2:  [92 %-100 %] 94 %  I/O last 3 completed shifts:  In: 4338.33 [I.V.:3408.33; NG/GT:180]  Out: 1365 [Urine:1365]    Constitutional: Well developed, well nourished female in no acute distress, appearing fatigued  Respiratory: Coarse lung sounds bilaterally with diminished right-sided lung sounds, no significant wheezing, mild tachypnea  Cardiovascular: irregular rhythm, normal rate, normal S1/S2 without murmur, rubs or gallops  GI: abdomen soft, non-tender, non-distended, normal bowel sounds  Lymph:  2+ pitting edema to knees bilaterally, mild macular rash over lower back  Other:  alert and appropriate, cranial nerves grossly intact    Medications     - MEDICATION INSTRUCTIONS -       sodium chloride 100 mL/hr at 03/03/18 2341       insulin aspart  1-7 Units Subcutaneous TID AC     insulin aspart  1-5 Units Subcutaneous At Bedtime     piperacillin-tazobactam  4.5 g Intravenous Q6H     ipratropium - albuterol 0.5 mg/2.5 mg/3 mL  3 mL Nebulization Q4H     sodium chloride  3 mL Nebulization Q4H     diltiazem  90 mg Oral Q6H     enoxaparin  90 mg Subcutaneous Daily     pantoprazole  40 mg Per G Tube QAM AC     metoprolol tartrate (LOPRESSOR) tablet 25 mg  25 mg Per G Tube Q8H     PARoxetine (PAXIL) tablet 20 mg  20 mg Per G Tube Daily     umeclidinium-vilanterol  1 puff Inhalation Daily     magic mouthwash suspension (diphenhydrAMINE, lidocaine, aluminum-magnesium & simethicone)  15 mL Swish & Spit 4x Daily     ipratropium - albuterol 0.5 mg/2.5 mg/3 mL  3 mL Nebulization Once       Data     Recent Labs  Lab 03/04/18  0610 03/03/18  0425 03/02/18 1952   WBC 20.3* 16.6* 14.9*   HGB 7.3* 7.4* 8.1*   MCV 82 81 81   * 446 511*     128* 127*   POTASSIUM 3.3* 3.7 3.8   CHLORIDE 98 88* 86*   CO2 32 34* 35*   BUN 22 15 18   CR 1.04 0.51* 0.51*   ANIONGAP 7 6 6   SHEA 8.2* 8.6 8.5   * 143* 136*       No results found for this or any previous visit (from the past 24 hour(s)).

## 2018-03-04 NOTE — PLAN OF CARE
Problem: Patient Care Overview  Goal: Plan of Care/Patient Progress Review  Pt A&O, anxious at times with cares. Refusing cares and repositioning. VSS on 4 LNC, sats mid 90's. Tele NSR. Pt desats on exertion. Clamped tube feeding. Adequate urine output. C/o of pain, oxycodone x1 with relief. Back rush unchanged. Blanchable erythema at coccyx. Pulmonary toilet encouraged. Will continue to monitor.

## 2018-03-04 NOTE — PROGRESS NOTES
Cross cover    Called about , not diabetic by H and P.    Will order SSI and check A1c.      Santos Juares MD

## 2018-03-04 NOTE — PROGRESS NOTES
RT was called to evaluate pt, pt was SOB on 4LNC BBS coarse. RT offered nebulizer to pt. Pt refused. After explaining the medication to pt and encouraging her to take it as it may help with the SOB. Pt agreed. DuoNeb was given with some relief. Pt was taken for imaging diagnostic on 5LNC SpO2 98%. Will continue to follow.  3/4/2018  Elizabeth Fuentes

## 2018-03-04 NOTE — PLAN OF CARE
Problem: Patient Care Overview  Goal: Plan of Care/Patient Progress Review  Outcome: No Change  Transfer from ICU at 1230. A/O x 4, VSS on 3L NC. Pt reports abdominal pain, near G-tube, Tylenol solution via G-tube administered, pt reported relief. LS coarse throughout. Pt refused PRN nebs, RT called for consult per MD. Frequent, productive/congested cough. +BS in all four quadrants. Bolus G-tube feeding administered at 1245, 310 ml per MD. Alvarado catheter patent. Order for CT scan, results pending. Will continue to monitor.     IV Abx running at 200 ml/hr over 30 min.   500 bolus NS running at 125 ml/hr over 4 hrs.

## 2018-03-04 NOTE — PLAN OF CARE
Problem: Patient Care Overview  Goal: Plan of Care/Patient Progress Review  Outcome: No Change  Pt on 4L/NC. 02 sats 96%. Lungs coarse, diminished with productive cough. Pt dyspneic with activity. Oxycodone prn pain. VSS. Good urine output per floyd. Pt transferring to . Pt's  at bedside - updated on plan of care. Cont to monitor.

## 2018-03-05 NOTE — PROGRESS NOTES
Pt was put on HFNC and vest therapy per MD order, pt could only tolerate the HFNC for couple of hours and requested to go back on NC despite RT recommendation. She is now on 5LNC. Pt also could not tolerate the vest therapy for a while and stopped it multiple times. Pt reports abdominal pain near G-tube. After reducing the HZ and duration of the therapy pt did end up doing the treatment for 8 minuets on 8HZ. Pt reports it helped her breathing. Will continue to follow.  3/4/2018  Elizabeth Fuentes

## 2018-03-05 NOTE — PROGRESS NOTES
[facility]    Hospitalist Progress Note      Assessment & Plan   Haley Mckeon is a 67 year old female who was admitted on 3/2/2018. Past history of non-small cell lung cancer s/p bronchial stenting, A-fib, SIADH, COPD, radiation-induced esophagitis s/p PEG placement who presents with progressive SOB.    Healthcare associated pneumonia.  Admission CT chest with patchy infiltrate vs early metastatic disease of RLL and LLL.  Leukocytosis of 14.9 at admission, no other SIRS criteria present.  Received vanco 3/3-3/4; transitioned from zosyn to meropenem 3/4 due to progressive leukocytosis and procal with patient reporting increased dyspnea.  - continue meropenem  - sputum culture with normal keyona only  - leukocytosis continues upward trend but afebrile and currently stable from respiratory standpoint - ?reactive; will repeat procal and also check C.diff as now reporting diarrhea    Acute hypoxic respiratory failure.  Likely due to combination of healthcare associated pneumonia and mucous plugging of RUL bronchial stent noted on admission CT.   Repeat CT chest 3/4 due to increased dyspnea shows new plugging of RLL bronchus.  - per RT, chest physiotherapy contraindicated due to G-tube  - continue acapella and pulm toilet with RT  - will stop 3% saline nebs (was on 0.9% nebs PTA), trial mucomyst nebs  - antibiotics as above  - Pro-BNP elevated 3/4 with edematous extremities, but will hold on lasix due to ANGELO    ANGELO:  Baseline Cr 0.5, up to 1.04 on 3/4.  Likely contrast-induced nephropathy, though cannot rule out vancomycin.  Occasional blood pressures of 90's systolic but no significant hypotension.  UA 3/4 unremarkable, urine Na not consistent with pre-renal azotemia.  - Cr up to 1.28 on 3/5, monitor BMP daily    Pre-diabetes:  New diagnosis.  A1C 6.3% this admission.  - SSI    Hypokalemia, hypomagnesemia:  - replace per protocol, to receive K on 3/5    COPD: No signs of acute exacerbation.  - continue  PTA Anora-Ellipta, Duonebs qid    SIADH:  Recent Na values about 130.  - Na trending slightly up, currently 140 on 3/5; monitor     Atrial fibrillation.  Continue PTA metoprolol and diltiazem for rate control; Lovenox for anticoagulation.     Radiation-induced esophagitis.  Patient status post PEG tube placement.    - Nutrition following for TF management     Non-small cell lung cancer.  Patient is followed at the [facility] ER.   - outside images have been requested for comparison, but have not been pushed to our system  - was due to resume radiation and chemotherapy this week, as not near point of discharge, will ask Oncology to follow    Chronic anemia.  Most recent hgb 2/26/18 was 8.5.    - hgb stable 7-8 g/dL on 3/5    # Pain Assessment:   Current Pain Score 3/5/2018 3/5/2018 3/5/2018   Patient currently in pain? yes sleeping: patient not able to self report -   Pain score (0-10) 4 - 3   Pain location Abdomen - -   Pain descriptors - - -   Haley bustillo pain level was assessed and she currently reports burning pain around G-tube site.  Agreeable to continuing current pain regimen of prn tylenol and oxycodone.    DVT Prophylaxis: Enoxaparin (Lovenox) SQ  Code Status: DNR    Disposition: Expected discharge pending improvement in dyspnea, ANGELO, signs of resolving infection.  >3 days.    Santos Downing    Interval History   Reports dyspnea somewhat improved today.  Currently producing minimal sputum.  Denies any fever/chills, wheezing.  Does endorse some orthopnea.  Edema unchanged.  Reports burning pain around G-tube site and complains of some heartburn.  Also reporting 3 loose/watery BM's this AM.    -Data reviewed today: I reviewed all new labs and imaging results over the last 24 hours. I personally reviewed no images or EKG's today.    Physical Exam   Temp: 98.1  F (36.7  C) Temp src: Axillary BP: 119/51 Pulse: 90 Heart Rate: 100 Resp: 18 SpO2: 93 % O2 Device: Nasal cannula Oxygen Delivery: 4 LPM  Vitals:    03/03/18  0000 03/04/18 0400 03/05/18 0644   Weight: 62.7 kg (138 lb 3.7 oz) 64.2 kg (141 lb 8.6 oz) 65.3 kg (143 lb 15.4 oz)     Vital Signs with Ranges  Temp:  [98  F (36.7  C)-98.1  F (36.7  C)] 98.1  F (36.7  C)  Pulse:  [90] 90  Heart Rate:  [] 100  Resp:  [16-22] 18  BP: (112-137)/(51-83) 119/51  FiO2 (%):  [40 %] 40 %  SpO2:  [93 %-98 %] 93 %  I/O last 3 completed shifts:  In: 7377 [I.V.:5220; NG/GT:300; IV Piggyback:1057]  Out: 2975 [Urine:2975]    Constitutional: Well developed, well nourished female in no acute distress  Respiratory: Lung sounds slightly diminished on right, less coarse throughout today, no tachypnea or wheezing  Cardiovascular: irregular rhythm, normal rate, normal S1/S2 without murmur, rubs or gallops  GI: abdomen soft, normal bowel sounds  Lymph:  1+ pitting edema to knees bilaterally  Other:  alert and appropriate, cranial nerves grossly intact    Medications     - MEDICATION INSTRUCTIONS -         albuterol  2.5 mg Nebulization Q4H     meropenem  1 g Intravenous Q12H     insulin aspart  1-7 Units Subcutaneous TID AC     insulin aspart  1-5 Units Subcutaneous At Bedtime     sodium chloride (PF)  3 mL Intracatheter Q8H     sodium chloride  3 mL Nebulization Q4H     diltiazem  90 mg Oral Q6H     enoxaparin  90 mg Subcutaneous Daily     pantoprazole  40 mg Per G Tube QAM AC     metoprolol tartrate (LOPRESSOR) tablet 25 mg  25 mg Per G Tube Q8H     PARoxetine (PAXIL) tablet 20 mg  20 mg Per G Tube Daily     umeclidinium-vilanterol  1 puff Inhalation Daily     ipratropium - albuterol 0.5 mg/2.5 mg/3 mL  3 mL Nebulization Once       Data     Recent Labs  Lab 03/05/18  0903 03/04/18  1425 03/04/18  0610 03/03/18  0425   WBC 26.7*  --  20.3* 16.6*   HGB 7.8*  --  7.3* 7.4*   MCV 84  --  82 81   *  --  480* 446     --  137 128*   POTASSIUM 3.1* 3.4 3.3* 3.7   CHLORIDE 100  --  98 88*   CO2 32  --  32 34*   BUN 25  --  22 15   CR 1.28*  --  1.04 0.51*   ANIONGAP 8  --  7 6   SHEA 8.7  --   8.2* 8.6   *  --  111* 143*       Recent Results (from the past 24 hour(s))   CT Chest w/o Contrast    Narrative    CT CHEST WITHOUT CONTRAST 3/4/2018 2:50 PM     HISTORY: Presenting with pneumonia, mucous plugging and history of  lung cancer; increasing dyspnea with progressing leukocytosis and  procalcitonin.     COMPARISON: CT chest 3/2/2018.    TECHNIQUE: Axial images from the thoracic inlet to the lung bases are  performed with additional coronal reformatted images. No contrast is  utilized.  Radiation dose for this scan was reduced using automated  exposure control, adjustment of the mA and/or kV according to patient  size, or iterative reconstruction technique.    FINDINGS:     Chest: Posterior medial left lower lobe consolidation is noted with  some degree of volume loss in the left lower lobe. Right lower lobe  pneumatocele is present. Bilateral bronchial stents are present. The  right lower lobe bronchial stent and right upper lobe bronchial stent  are occluded on series 3, image 27 and series 3, image 30 possibly due  to clot or hemorrhage. Infiltrative mass remains in the differential.  Right lower lobe bronchial stent was patent on prior recent CT but the  upper lobe stent was occluded. Findings would suggest progression of  underlying process possibly hemorrhage. Right suprahilar mass is  unchanged. Slight interval enlargement of the right pleural effusion  is noted. Trace amount left pleural fluid is also present which is new  since prior study. Increasing volume loss right lower lobe is likely  related to the occluded bronchiolar stent. Heart is normal in size.   No pericardial fluid is evident. Limited images upper abdomen are  grossly unremarkable. Degenerative spine changes are noted.      Impression    IMPRESSION:  1. Interval occlusion of the right lower lobe bronchiolar stent.  Persistent occlusion right upper lobe bronchiolar stent. This could be  due to hemorrhage or debris. Follow-up  bronchoscopy for further  evaluation. Left bronchiolar stent remains patent. Left lower lobe  infiltrate appears more consolidative now consistent with underlying  pneumonia.  2. Pleural effusions have increased slightly since the recent chest  CT. Volume loss right hemithorax is likely due to the occluded  bronchiolar stents.      KIRAN RUBIO MD

## 2018-03-05 NOTE — PLAN OF CARE
Problem: Patient Care Overview  Goal: Plan of Care/Patient Progress Review  Outcome: No Change  A&O x4.  Ax1-2 with TARA walker to BSC.  Takes sips of hot water, but nutrition is via G-tube.  Free water given with meds.  230 mL gastric residual prior to evening bolus.  Residual wasted and held evening bolus.  PRN Oxycodone x2 for pain at G-tube site, which is clamped.  Mucous drainage from G-tube required cleansing and gauze change x1.  Refuses to turn/repo, HOB elevated due to SOB.  Educated refusal on pressure injury prevention.  Mepilex to blanchable redness on coccyx.  VSS.  Briefly on HFNC, but eventually refused it due to discomfort and has been stable on 3-6 LPM via NC.  Tolerated physiotherapy.  LS wheezy throughout.  RUFFIN.  Fair nonproductive cough.  Allergy to Aloe - can only use one type of wipe product, which was shown to oncoming staff.  Cannot use Alvarado wipes.  BUE and BLE edema; pitting in legs and feet.  IV NS bolus cancelled by MD at start of shift and was given dose of IV lasix instead.  Two SL IV's.  Alvarado for retention with 1900 mL out.  , 122.  Discharge pending.

## 2018-03-05 NOTE — PROGRESS NOTES
After neb tx at 1500, pt was having difficulty breathing, lung sounds coarse. RT suctioned at back of throat with a 14 fr catheter with improvement. Will continue to follow.    Jhonathan Gore RT

## 2018-03-05 NOTE — PLAN OF CARE
Problem: Patient Care Overview  Goal: Plan of Care/Patient Progress Review  PT: Orders received and chart reviewed. Eval attempted, pt up with respiratory therapy and consult with MD. Nursing stating patient is tolerating getting up to commode with assist of two and little else. Will plan to hold PT eval this day and r/s for 3/6

## 2018-03-05 NOTE — CONSULTS
Consult Date:  03/05/2018      REQUESTING PHYSICIAN:  This consult has been requested by Dr. Santos Downing for lung cancer.      HISTORY OF PRESENT ILLNESS:    Mrs. Mckeon is a 67-year-old female with non-small cell lung cancer.  Patient follows up at [facility].  Her history goes back to 12/2017.  The patient was seen in urgent care on 12/29/2017 for chest infection.  Chest x-ray on 12/29/2017 revealed mass-like opacity in the right lung.  CT chest on 12/29/2017 revealed large right upper lung/perihilar mass.  There was associated bulky mediastinal and right hilar lymphadenopathy.      Patient went to [facility].  She had multiple investigations done.  Patient has non-small cell lung cancer (squamous cell carcinoma of the lung).  As per the patient, it is stage III disease.  Patient had bronchial obstruction from malignancy.  She had stents placed. Patient was started on palliative radiation and chemotherapy.      I spoke to radiation oncology, Dr. Grove at [phone].  She mentioned that the patient got 5 fractions of radiation.  Plan is for patient to have another course of radiation starting today.  Patient also received chemotherapy with platinum and etoposide.  Details of treatment at [facility], pathology report and other investigations not available.      Patient presented to the emergency room on 03/02/2018 with worsening shortness of breath and weakness.  Patient had multiple investigations done in the ER.     -- WBC of 14.9, hemoglobin of 8.1 and platelet of 511.   -- Sodium of 127.   -- D-dimer of 3.2.   -- CT chest angiogram did not reveal any pulmonary embolism.  The right middle and lower arteries appear occluded by a large hilar mass.  There is right paratracheal and subcarinal lymphadenopathy.  There is patchy infiltrate or early metastatic nodule in the right lower lobe.  Right upper lobe bronchus stent appears occluded.      Patient was admitted to the  hospital.  She is being treated for pneumonia with IV antibiotics.  She is getting nebulizer.      Patient had repeat CT chest done on 03/04/2018.  It reveals interval occlusion of right lower lobe bronchial stent.  There is persistent occlusion of right upper lobe stent.  Left bronchiolar stent remains patent.  There is left lower lobe pneumonia.      REVIEW OF SYSTEMS:  I met with the patient.  Her  was there.  The patient feels very weak.  She continues to have shortness of breath.  No hemoptysis.  She has some headache.  Some dizziness.  No neck pain.  No abdominal pain.  Some nausea.  Appetite is decreased.  Overall, she does not feel good.      The patient is not having any high grade fever.      All other review of systems negative.      ALLERGIES:  REVIEWED.      MEDICATIONS:  Reviewed.      PAST MEDICAL HISTORY:   1.  Stage III non-small cell lung cancer (squamous cell carcinoma) as described above.  The patient on palliative radiation and chemotherapy.   2.  Atrial fibrillation.   3.  SIADH.   4.  COPD.   5.  Malnutrition.   6.  PEG placement before radiation started.   7.  Status post stent placement.   8.  Bronchial obstruction from malignancy.      SOCIAL HISTORY:     -- Patient is .     -- She quit smoking about 10 years ago.    -- No alcohol abuse.      PHYSICAL EXAMINATION:   GENERAL:  Patient was alert and oriented x3.  She was mildly short of breath.   Rest of the system not examined.      LABORATORY DATA:  Reviewed.      ASSESSMENT:   1.  A 67-year-old female with non-small cell lung cancer admitted with worsening shortness of breath secondary to pneumonia.   2.  Stage III squamous cell carcinoma of the lung, on palliative radiation and chemotherapy at [facility].   3.  Pneumonia, likely post-obstructive.     4.  Leukocytosis secondary to pneumonia.     5.  Thrombocytosis, reacting   6.  Anemia.   7.  Atrial fibrillation.   8.  Chronic obstructive pulmonary disease.   9.   Chronic anemia.      RECOMMENDATIONS:   1.  I had a long discussion with the patient and her .  Discussed regarding lung cancer.  Patient has stage III disease.  She is currently on radiation and chemotherapy at [facility].      Patient was scheduled to start radiation today.  She is admitted here because of pneumonia.        Patient had CT scans done here.  It reveals occlusion of the right lower lobe and right upper lobe bronchial stent.  Left bronchial stent remains open.      Pulmonologist from [facility], Dr. Javier Ward ([phone]), called me.  We discussed the case.  Both the pulmonologist and radiation oncologist feel that it would be better for patient to be transferred to [facility].  That way patient can have bronchoscopy and debulking of the bronchial tumor and start radiation.      I discussed this with the  and patient.  Patient would like to be transferred to [facility] for further treatment.      I spoke to Dr. Downing.  He will plan to facilitate the transfer of patient.        2.   had a few questions, which were all answered.       Thank you for the consult.      Total time spent 60 minutes, more than 50% of time was spent in counseling and coordination of care.         LUPILLO SEQUEIRA MD             D: 2018   T: 2018   MT: [id number]      Name:     DAVE NOGUERA   MRN:      [id number]        Account:       [id number]   :      1950           Consult Date:  2018      Document: [id number]

## 2018-03-05 NOTE — PROVIDER NOTIFICATION
MD Notification    Notified Person:  MD    Notified Persons Name: Dr. Downing    Notification Date/Time: 3/5/18 1255    Notification Interaction:  Paged Physician    Purpose of Notification: Dr. Mullon called from [place].  States he wants orders placed for NPO after midnight so they can do a bronch when she arrives at [facility] [place] tomorrow.    Orders Received: NPO after midnight.  Hold tube feedings on 3/6/18.    Comments:

## 2018-03-05 NOTE — PLAN OF CARE
Problem: Patient Care Overview  Goal: Plan of Care/Patient Progress Review  OT: Eval attempted, however pt receiving nursing cares and with RT.     OT: Per discussion with PT, pt not appropriate to engage in OT today. Pt would be more appropriate to wait with OT eval until tomorrow.

## 2018-03-05 NOTE — PLAN OF CARE
Problem: Patient Care Overview  Goal: Plan of Care/Patient Progress Review  Outcome: No Change  A/O x4. Vitals stable on 4L nc. Congested cough. LS dim in R lobes. Up with 1 and GB to BSC. Alvarado patent with good UO. PRN Oxy and Tylenol for pain. G-tube site patent, C/D/I. No residual this shift. Free water given with meds. . Continues Merrem, scheduled nebs. D/c pending clinical progress.

## 2018-03-06 NOTE — PLAN OF CARE
Problem: Patient Care Overview  Goal: Plan of Care/Patient Progress Review  Outcome: No Change  A&O x4.  Ax1 to BSC.  C. Diff negative and enteric isolation discontinued.  VSS on 6 LPM via NC.  Oral suction x2-3 promoted comfort with breathing.  LS coarse throughout. , 210.  Received evening TF tonight; residual 5 mL prior to feeding.  K recheck 4.2.  Refuses HFNC, turn/repo, and cont pulse ox.  Does tolerate PCD's.  BUE and BLE edema.  Ankles and feet with 3+ pitting edema.  Extremities cool to touch, but patient keeps room very cool per preference.  Educated refusal on PIP.  Mepilex to fragile tissue of coccyx.  HOB elevated to 45ish degrees to promote better breathing.  Slept most of shift; reports feeling very tired.  Two PIV's SL.  Received IV abx.  Alvarado with 175mL out; no Lasix today due to ANGELO.  PRN Tylenol x1, Biotene x1, and Oxycodone x1 effective.  NPO at midnight and hold TF's on 3/6.  Patient to transfer to [facility] 3/6.

## 2018-03-06 NOTE — PLAN OF CARE
Problem: Patient Care Overview  Goal: Plan of Care/Patient Progress Review  Outcome: No Change  Transferring to [facility] with [place] via HE stretcher at this time.  Loss of IV access post IV infusion of antibiotic due to leaking, no symptoms at site other than wetness; IV removed and unable to restart prior to discharge.  Reports dry mouth, biotene given with some relief.  Productive cough of yellow thick sputum, requests oral suction several times with small amount of white sputum returned; requested deeper suction via nasal x 1 with small results and tolerated well.  Continues on 4 lpm nasal cannula with sats in the mid 90's.  BP and HR stable.  Did take scheduled meds with very small sips of water.  Dressing on buttock for redness/risk for PU; patient has declined to turn and reposition and is aware of risks for pressure ulcers as explained to her yesterday.

## 2018-03-06 NOTE — PROGRESS NOTES
Patient is on 6 LPM NC and SpO2 mid to high 90`s. Neb tx given as scheduled. BBS coarse/crackles. Weak cough. Orally suctioned for small amount of white thick secretions. Will continue to follow.

## 2018-03-06 NOTE — DISCHARGE SUMMARY
[facility]    Discharge Summary  Hospitalist    Date of Admission:  3/2/2018  Date of Discharge:  3/6/2018  Discharging Provider: Santos Downing  Date of Service (when I saw the patient): 03/06/18    Discharge Diagnoses   HCAP  Acute hypoxic respiratory failure  ANGLEO  Pre-diabetes  Hypokalemia  Hypomagnesemia  COPD  SIADH  Atrial fibrillation  Radiation-induced esophagitis  Non-small cell lung cancer  Chronic anemia  Thrombocytosis  Urinary retention  Severe malnutrition    History of Present Illness   Haley Mckeon is an 67 year old female who presented with shortness of breath, found to have pneumonia with mucous plugging.  Hospital course complicated by ANGELO thought due to contrast.    Hospital Course   Haley Mckeon was admitted on 3/2/2018.  The following problems were addressed during her hospitalization:    Healthcare associated pneumonia.  Admission CT chest with patchy infiltrate vs early metastatic disease of RLL and LLL.  Leukocytosis of 14.9 at admission, no other SIRS criteria present.  Received vanco 3/3-3/4; transitioned from zosyn to meropenem 3/4 due to progressive leukocytosis and procal with patient reporting increased dyspnea.  Sputum culture growing normal keyona, blood cultures ngtd.  Leukocytosis (14 >> 30 at discharge) and procalcitonin (0.56 >> 0.82) trending up despite antibiotics, patient remains afebrile.  See discussion of imaging below.  C.diff PCR 3/5 negative.     Acute hypoxic respiratory failure.  Likely due to healthcare associated pneumonia with mucous plugging.   Per report from [facility], had chronic obstruction of RUL bronchus which was noted on admission CT.  Repeat CT chest 3/4 due to increased dyspnea shows new plugging of RLL bronchus.  RT consulted for pulmonary toilet, she was continued on PTA neb treatments.  Following discussion with Dr. Ward (Pulmonologist at [facility]) on 3/5, decision was made to transfer to their facility for bronchoscopy and ongoing  treatment.  Currently stable on 6L NC.     ANGELO:  Baseline Cr 0.5, stable at admission but up to 1.04 on 3/4.  Likely contrast-induced nephropathy (dye load 3/2), though cannot rule out vancomycin toxicity (stopped 3/4, no levels were obtained to document supratherapeutic levels).  Occasional blood pressures of 90's systolic but no significant hypotension.  UA 3/4 unremarkable, urine Na not consistent with pre-renal azotemia. Cr appears to be plateauing at 1.20 on day of discharge.    Non-small cell lung cancer.  Patient is followed at the [facility].  Hx of right lung obstruction by tumor s/p debulking and stent placement and subsequent stent exchange.  Will transfer to [place] 3/6 for evaluation by Pulmonology and Oncology as noted above.      Pre-diabetes:  New diagnosis.  A1C 6.3% this admission.  Placed on moderate dose SSI.     Hypokalemia, hypomagnesemia:  Replaced per protocol.     COPD: No signs of acute exacerbation.  Continue PTA Anora-Ellipta, Duonebs qid.     SIADH:  Recent Na values about 130.  Na up to about 140 this admission.      Atrial fibrillation.  Continue PTA metoprolol and diltiazem for rate control; Lovenox for anticoagulation (dose held 3/6 in anticipation of bronchoscopy).      Radiation-induced esophagitis.  Patient status post PEG tube placement.  Currently endorsing pain at tube site but functioning well without signs of surrounding erythema or leakage.     Chronic anemia.  Most recent hgb 2/26/18 was 8.5, remains stable at about 8 g/dL.    Thrombocytosis.  Platelets trending up this admission (446 >> 928 at discharge), suspect reactive in setting of acute illness.    Urinary retention.  Alvarado placed 3/2 due to retention.    Severe malnutrition.  Has experienced >5% weight loss in past month, has had <50% intake for 1 month.    # Discharge Pain Plan:   - During her hospitalization, Haley experienced pain due to PEG tube.  The pain plan for discharge was discussed with Haley and the plan  was created in a collaborative fashion.    - Chronic/continued opioids: oxycodone      Santos Downing    Significant Results and Procedures   See imaging below    Pending Results   These results will be followed up by: Hositalist service  Unresulted Labs Ordered in the Past 30 Days of this Admission     Date and Time Order Name Status Description    3/2/2018 2009 Blood culture Preliminary     3/2/2018 2009 Blood culture Preliminary           Code Status   DNR       Primary Care Physician   Rhys Terry    Constitutional: well developed, well nourished female in no acute distress  Respiratory: diminished right sided lung sounds, few left-sided crackles, no wheezing or tachypnea  Cardiovascular: regular rate and rhythm, normal S1/S2, no murmur, rubs or gallops  GI: abdomen soft, non-tender, non-distended, normal bowel sounds, reports pain at PEG tube insertion site  Lymph/Hematologic: trace-1+ peripheral edema  Musculoskeletal: Extremities warm and well perfused  Neurologic: alert and appropriate, cranial nerves grossly intact, gross motor movements intact  Psychiatric: normal affect    Discharge Disposition   Discharged to [facility]  Condition at discharge: Stable    Consultations This Hospital Stay   NUTRITION SERVICES ADULT IP CONSULT  INTENSIVIST IP CONSULT  PHARMACY TO DOSE VANCO  PHARMACY IP CONSULT  PHYSICAL THERAPY ADULT IP CONSULT  OCCUPATIONAL THERAPY ADULT IP CONSULT  HEMATOLOGY & ONCOLOGY IP CONSULT    Time Spent on this Encounter   I, Santos Downing, personally saw the patient today and spent greater than 30 minutes discharging this patient.    Discharge Orders     Reason for your hospital stay   You were admitted for pneumonia with mucous plugging.     Alvarado catheter   To straight gravity drainage. Change catheter every 2 weeks and PRN for leaking or decreased uring output with signs of bladder distention. DO NOT change catheter without a specific MD order IF diagnosis of benign prostatic hypertrophy  (BPH), neurogenic bladder, or other urological conditions     Follow Up and recommended labs and tests   Follow up with providers at [facility] for ongoing treatment.     Activity - Up with nursing assistance     Wound care   Site:   PEG tube  Instructions:  Routine cares.     DNR (Do Not Resuscitate)     Oxygen - Nasal cannula   6 Lpm by nasal cannula to keep O2 sats 92% or greater.     Advance Diet as Tolerated   Follow this diet upon discharge:  CURRENTLY NPO AND HOLDING TUBE FEEDS PRIOR TO BRONCHOSCOPY     Prior tube feeding: Adult Formula Bolus Feeding: TID Isosource 1.5; Route: Gastrostomy; 4; Can(s); Medication - Tube Feeding Flush Frequency: 30 mL water before and after tube feeds; 1.5 cans q  AM, 1.5 cans q Noon, 1 can q Evening       Discharge Medications   Current Discharge Medication List      START taking these medications    Details   meropenem (MERREM) 1 G vial Inject 1,000 mg (1 g) into the vein every 12 hours  Qty: 30 each    Associated Diagnoses: Pneumonia of both lower lobes due to infectious organism         CONTINUE these medications which have NOT CHANGED    Details   acetaminophen (TYLENOL) 32 mg/mL solution Take 960 mg by mouth every 6 hours as needed for fever or mild pain      albuterol (PROAIR HFA/PROVENTIL HFA/VENTOLIN HFA) 108 (90 BASE) MCG/ACT Inhaler Inhale 1-2 puffs into the lungs every 4 hours as needed for shortness of breath / dyspnea or wheezing      alum & mag hydroxide-simethicone (MYLANTA/MAALOX) 200-200-20 MG/5ML SUSP suspension 30 mLs by Gastric Tube route every 4 hours as needed for indigestion      umeclidinium-vilanterol (ANORO ELLIPTA) 62.5-25 MCG/INH oral inhaler Inhale 1 puff into the lungs daily      DILTIAZEM HCL PO 90 mg by Gastric Tube route every 6 hours      ENOXAPARIN SODIUM SC Inject 90 mg Subcutaneous daily      LANsoprazole (PREVACID) 3 mg/mL SUSP 30 mg by Gastric Tube route every morning (before breakfast)      DPH-Lido-AlHydr-MgHydr-Simeth (FIRST-MOUTHWASH  BLM MT) Take 15 mLs by mouth 4 times daily 270--40/30 mL      ipratropium (ATROVENT) 0.02 % neb solution Take 0.5 mg by nebulization 4 times daily      METOPROLOL TARTRATE PO Take 25 mg by mouth every 8 hours      nystatin (MYCOSTATIN) 548323 UNIT/ML suspension Take 500,000 Units by mouth 4 times daily      oxyCODONE (ROXICODONE) 5 MG/5ML solution Take 5 mg by mouth every 3 hours as needed for moderate to severe pain      sodium chloride 0.9 % neb solution Take 3 mLs by nebulization 4 times daily      PAROXETINE HCL PO Take 20 mg by mouth daily      senna-docusate (SENOKOT-S;PERICOLACE) 8.6-50 MG per tablet Take 1 tablet by mouth daily as needed for constipation           Allergies   Allergies   Allergen Reactions     Aloe Rash     Data   Most Recent 3 CBC's:  Recent Labs   Lab Test  03/05/18   0903 03/04/18   0610  03/03/18   0425   WBC  26.7*  20.3*  16.6*   HGB  7.8*  7.3*  7.4*   MCV  84  82  81   PLT  829*  480*  446      Most Recent 3 BMP's:  Recent Labs   Lab Test  03/05/18   1745  03/05/18   0903  03/04/18   1425  03/04/18   0610  03/03/18   0425   NA   --   140   --   137  128*   POTASSIUM  4.2  3.1*  3.4  3.3*  3.7   CHLORIDE   --   100   --   98  88*   CO2   --   32   --   32  34*   BUN   --   25   --   22  15   CR   --   1.28*   --   1.04  0.51*   ANIONGAP   --   8   --   7  6   SHEA   --   8.7   --   8.2*  8.6   GLC   --   105*   --   111*  143*     Most Recent 6 Bacteria Isolates From Any Culture (See EPIC Reports for Culture Details):  Recent Labs   Lab Test  03/03/18   1130  03/02/18   2251  03/02/18   2040   CULT  Moderate growth  Normal keyona    No growth after 3 days  No growth after 4 days     Most Recent TSH, T4 and A1c Labs:  Recent Labs   Lab Test  03/04/18   0610   A1C  6.3*     Results for orders placed or performed during the hospital encounter of 03/02/18   Chest CT, IV contrast only - PE protocol    Narrative    CT CHEST PULMONARY EMBOLISM WITH CONTRAST   3/2/2018 8:55 PM  "    HISTORY: Lung cancer, stents, prior mucous plugging and rigid  bronchoscopy at [place], \"can't breath\".      TECHNIQUE: 58 mL Isovue-370. Radiation dose for this scan was reduced  using automated exposure control, adjustment of the mA and/or kV  according to patient size, or iterative reconstruction technique.    COMPARISON: None.    FINDINGS: Large right hilar mass with stent in the right mainstem  bronchus. Stent in right upper lobe bronchus appears occluded.  Extensive right paratracheal and subcarinal adenopathy. Patchy  infiltrates or early small metastatic nodules in the right lower lobe  and mildly so on the left lower lobe. Small right pleural effusion.  Scans through the upper abdomen are unremarkable.    No left-sided pulmonary emboli. There are patent right upper lobe  pulmonary vessels without evidence of upper lobe pulmonary embolus.  The pulmonary artery on the right is occluded proximal to the branches  to the right middle lobe and right lower lobe likely as a result of  the large right hilar mass. No definite filling defects just absence  of contrast of the right middle and right lower lobe pulmonary  arteries.      Impression    IMPRESSION:  1. The right middle and lower lobe pulmonary arteries appear occluded  by a large right hilar mass. There are no specific filling defects,  but apparent occlusion just prior to the branching of the right middle  and right lower lobe pulmonary artery branches. It would be difficult  to exclude pulmonary emboli here, but it has the appearance of  occlusion due to mass effect.  2. No left-sided pulmonary emboli.  3. Very prominent right paratracheal and subcarinal adenopathy.  4. Patchy infiltrates or early small metastatic nodules in the right  lower lobe and mildly so on the left lower lobe.  5. Small right pleural effusion.   6. Right mainstem bronchus stent appears patent extending toward the  right lower lobe. The right upper lobe bronchus stent " appears  occluded.    DELROY PEREZ MD   CT Chest w/o Contrast    Narrative    CT CHEST WITHOUT CONTRAST 3/4/2018 2:50 PM     HISTORY: Presenting with pneumonia, mucous plugging and history of  lung cancer; increasing dyspnea with progressing leukocytosis and  procalcitonin.     COMPARISON: CT chest 3/2/2018.    TECHNIQUE: Axial images from the thoracic inlet to the lung bases are  performed with additional coronal reformatted images. No contrast is  utilized.  Radiation dose for this scan was reduced using automated  exposure control, adjustment of the mA and/or kV according to patient  size, or iterative reconstruction technique.    FINDINGS:     Chest: Posterior medial left lower lobe consolidation is noted with  some degree of volume loss in the left lower lobe. Right lower lobe  pneumatocele is present. Bilateral bronchial stents are present. The  right lower lobe bronchial stent and right upper lobe bronchial stent  are occluded on series 3, image 27 and series 3, image 30 possibly due  to clot or hemorrhage. Infiltrative mass remains in the differential.  Right lower lobe bronchial stent was patent on prior recent CT but the  upper lobe stent was occluded. Findings would suggest progression of  underlying process possibly hemorrhage. Right suprahilar mass is  unchanged. Slight interval enlargement of the right pleural effusion  is noted. Trace amount left pleural fluid is also present which is new  since prior study. Increasing volume loss right lower lobe is likely  related to the occluded bronchiolar stent. Heart is normal in size.   No pericardial fluid is evident. Limited images upper abdomen are  grossly unremarkable. Degenerative spine changes are noted.      Impression    IMPRESSION:  1. Interval occlusion of the right lower lobe bronchiolar stent.  Persistent occlusion right upper lobe bronchiolar stent. This could be  due to hemorrhage or debris. Follow-up bronchoscopy for further  evaluation. Left  bronchiolar stent remains patent. Left lower lobe  infiltrate appears more consolidative now consistent with underlying  pneumonia.  2. Pleural effusions have increased slightly since the recent chest  CT. Volume loss right hemithorax is likely due to the occluded  bronchiolar stents.      KIRAN RUBIO MD

## 2018-03-06 NOTE — PLAN OF CARE
Problem: Patient Care Overview  Goal: Plan of Care/Patient Progress Review  Discharge Planner PT   Patient plan for discharge: Transfer to [facility]  Current status: Pt not evaluated by PT prior to transfer to [facility].  Barriers to return to prior living situation: NA  Recommendations for discharge: NA  Rationale for recommendations: Pt will need PT assessment at new location if appropriate.       Entered by: Evelin Brown 03/06/2018 4:39 PM

## 2018-03-06 NOTE — PLAN OF CARE
Problem: Patient Care Overview  Goal: Plan of Care/Patient Progress Review  Discharge Planner OT   Patient plan for discharge: Transfer to [place] today    Current status: Pt has not been evaluated by OT at time of hospitalization. Per chart review, pt discharge to different hospital today.     Barriers to return to prior living situation: N/A    Recommendations for discharge: N/A    Rationale for recommendations: Pt would benefit from skilled OT; however, discharging to different hospital today. Defer OT needs at this time.        Entered by: Tate Pettit 03/06/2018 11:19 AM

## 2018-03-06 NOTE — PROGRESS NOTES
Spoke with Micaela Headley at [facility]. Patient has been accepted at their facility Dr Mcelroy will be the accepting MD. [facility] transportation  set for 10:45 am. Ambulance to call facility 30mins prior to arrival for bed assignment. [phone]. Spoke with patient and she is in agreement with the transfer.

## 2018-03-06 NOTE — PLAN OF CARE
Problem: Patient Care Overview  Goal: Plan of Care/Patient Progress Review  Outcome: No Change  Pt A/Ox4. Assist x1 to BSC, fall risk. VSS on 6L NC. Complaints of abdominal pain at G-tube site, relief with oxycodone. Lung sounds coarse with infrequent cough. Nebs/oral suctioning per RT. G-tube patent with minimal residual. NPO for possible bronchoscopy at [place] today. . Refusing PCDs. Bilateral upper and lower extremity edema. Refused repositioning, educated on risk of pressure ulcers. Mepilex in place on coccyx. Alvarado patent with decreased output. Plan to transfer to [place] today.

## 2018-04-01 NOTE — ED NOTES
RT here deep suctioned. Pt tolerated well. Minimal secretions returned. Lungs sounds improved. Pt reports ease in work of breathing.

## 2018-04-01 NOTE — ED NOTES
Pt aroused with verbal stimuli. Answers questions with yes and no. Pericare given after bedpan use. EMS here.

## 2018-04-01 NOTE — ED NOTES
RT here assisting EMS team with switching to EMS CPAP. Pt became anxious with mask changing. Emotional support given. Pt calms. Tolerating EMS settings on CPAP. O2 sats 98%.

## 2018-04-01 NOTE — ED NOTES
Bed: ED30  Expected date: 3/31/18  Expected time: 10:09 PM  Means of arrival: Ambulance  Comments:  Margi Lew

## 2018-04-01 NOTE — ED PROVIDER NOTES
History     Chief Complaint:  Shortness Of Breath    HPI   Haley Mckeon is a 67 year old female post lung cancer diagnosis and with a history of CPAP for FINA, COPD, CAD, Afib, and hypertension who presents with shortness of breath. The patient's spouse reports that they were seen at a hospital today for respiratory failure secondary to malignancy and mucous plugging of stents, arrived home at 1900 hours with one lorazepam taken, and then the patient became anxious with panic attacks and had abnormal breaths with a shallow cough, prompting the patient's presentation here by EMS with two Versed administered en route. The patient's spouse also reports that the patient was seen at [facility] for a lung cancer diagnosis on December 29th, 2017 and since has been given three rounds of chemo and three rounds of radiation with disruption to treatment plan due to pneumonia. The patient's spouse further reports that three weeks ago the patient was last seen at [facility] for pneumonia and routed by EMS to [facility]. Upon presentation, the patient's spouse reports the patient to continuing to have abnormal breathing compared to baseline and talking is at baseline, noting she uses a suction machine at home. The patient's spouse denies the patient having fevers. The patient's spouse noted the patient having four stents in her lungs and notes that her shortness of breath and anxiety states happened last time upon discharge.    History is otherwise limited secondary to acuity of patient.     Allergies:  Robitussin Cough-Cold D  Simethicone  Duoneb    Medications:    Merrem  Albuterol  Maalox  Anoro Ellipta  Diltiazem  Enoxaparin Sodium  Prevacid  Atrovent  Mycostatin  Metoprolol Tartrate  Roxicodone  Paroxetine  Senna-docusate  sodium chloride 0.9 % neb solution  DPH-Lido-AHydr-Simeth  Tylenol 325 mg    Past Medical History:    HCAP  Lung cancer  Atrial Fibrillation with RVR  CAD  Cancer  Chewing tobacco nicotine  dependence  Chronic hypercapnic respiratory failure   Chronic obstructive pulmonary disease with severity to be determined  GERD  Asbestos exposure  SIADH  Hyperlipidemia   Hypertension  Major depressive disorder in remission  FINA on CPAP  Radiation-induced esophagitis  UTI    Past Surgical History:    Bronchoscopy, Dilate Bronchus/Trachea    Family History:    History reviewed. No pertinent family history.     Social History:  Marital Status:     Tobacco Status: Former Smoker  Alcohol Use: No  Presents With: Spouse        Review of Systems   Unable to perform ROS: Severe respiratory distress     Physical Exam     Patient Vitals for the past 24 hrs:   BP Temp Temp src Heart Rate Resp SpO2 Weight   04/01/18 0130 132/82 - - 98 19 - -   04/01/18 0115 121/71 - - 95 18 98 % -   04/01/18 0114 100/66 - - 94 20 99 % -   04/01/18 0100 100/66 - - 93 17 98 % -   04/01/18 0045 97/58 - - 93 19 98 % -   04/01/18 0030 98/55 - - 92 21 97 % -   04/01/18 0015 101/61 - - 94 18 97 % -   04/01/18 0000 98/57 - - 101 20 95 % -   03/31/18 2345 111/77 - - 108 19 96 % -   03/31/18 2330 107/71 - - 151 26 92 % -   03/31/18 2315 98/62 - - 163 25 100 % -   03/31/18 2302 109/56 - - 152 22 (!) 88 % -   03/31/18 2301 96/64 - - 156 22 94 % -   03/31/18 2300 (!) 81/59 - - 156 24 (!) 87 % -   03/31/18 2245 - - - 158 25 99 % -   03/31/18 2231 118/86 97.3  F (36.3  C) Oral - - 100 % 59 kg (130 lb)   03/31/18 2230 118/86 - - - - 99 % -   03/31/18 2227 - - - 165 - 98 % -     Physical Exam  General: Sitting on cart, in acute distress  Head:  The scalp, face, and head appear normal  Eyes:  Conjunctivae are normal  ENT:    The nose is normal    Pinnae are normal    External acoustic canals are normal  Neck:  Trachea midline  CV:  Pulses are normal, tachycardic, irregular  Resp:  Tachypnea present, crackles bilaterally with audible upper airway sounds, equal breath sounds bilaterally   Abdomen:      Soft, non-tender, non-distended  Musc:  Normal  muscular tone    No major joint effusions    Bilateral lower extremity edema   Skin:  No rash or lesions noted  Neuro:  Speech is normal and fluent. Face is symmetric.     Moving all extremities well.   Psych: Awake. Alert.  Normal affect.  Appropriate interactions.    Emergency Department Course   ECG:  ECG (22:40:06):  Rate 169 bpm. AL interval *. QRS duration 88. QT/QTc 286/479. P-R-T axes *  -46  -43. Interpretation: Atrial Fibrillation with Rapid Ventricular Response; Left Axis Deviation; Low Voltage QRS; Inferior Infract, Age Undetermined; Cannot Rule Out Anterior Infarct, Age Undetermined; Abnormal ECG Interpreted at 2242 by Marli Allison MD on [id number].    Imaging:  Radiographic findings were communicated with the patient and spouse who voiced understanding of the findings.  X-ray Chest Port, 1 views:  IMPRESSION :  1. Right lower lung mass consistent with patient's malignancy  demonstrated on CT.  2. Mild left lower lobe infiltrate consistent with pneumonia.  Result per radiology.     Laboratory:  Potassium: Pending  2259: ISTAT Gases Lactate Venous POCT: pH 7.24 (L), PCO2 90 (HH), Bicarbonate 39 (H) o/w Unremarkable  0019: ISTAT Gases Lactate Venous POCT: PCO2 66 (H), Bicarbonate 37 (H) o/w Unremarkable  CBC: WBC 16.2 (H), RBC 3.53 (L), HGB 9.4 (L), HCT 30.6 (L), MCHC 30.7 (L), RDW 18.6 (H),  (H), Absolute Neutrophil 14.8 (H), Absolute Lymphocytes 0.4 (L) o/w WNL  BMP: Sodium 128 (L), Potassium 6.0 (H), Chloride 91 (L), Carbon Dioxide 33 (H), Glucose 188 (H) o/w WNL (Creatinine 0.62)  2250: Troponin I: 0.018    Interventions:  Lopressor Injection 5 mg IV    Emergency Department Course:  The patient arrived in the emergency department via EMS.  Past medical records, nursing notes, and vitals reviewed.  2249: I performed an exam of the patient and obtained history, as documented above.   IV inserted and blood drawn. The patient was placed on continuous cardiac monitoring and pulse oximetry.  The  patient was sent for a Chest Port X-ray while in the emergency department, findings above.  2322: I discussed the case with Dr. Lu of Pulmonary at [facility] regarding the patient.  Findings and plan explained to the patient and spouse.  0017: Patient will be transferred to [facility] via EMS. Discussed the case with Dr Lu, who will admit the patient to a monitored ICU bed for further monitoring, evaluation, and treatment.   0044: I discussed the case again with Dr. Lu regarding the patient.    Impression & Plan      Medical Decision Making:  Vivian Ceballos is a 67-year-old female with a history of lung cancer, COPD, coronary disease and atrial fibrillation who presents with respiratory distress.  Patient arrived and was quite tachypneic although somewhat altered likely partly secondary to Versed given by EMS for anxiety.  She had respiratory rate into the 40s and 50s lungs sounded very coarse bilaterally.  She had audible secretions in the back of her mouth.  Her vitals revealed tachycardia at 160 consistent with atrial fibrillation with RVR.  Her blood pressure was little on the soft side in the low 100s.  Her stats were normal with 4 L of oxygen which she is normally on 3 L.  Patient was recently at [place] for mucus plugging and had bronchial stents and bronchoscopy done.  It seems like what happened today she went home got very anxious a bit about being home flipped into [facility] with RVR and became extremely anxious and probably had more mucus plugging.  We called her RT for suctioning which they were able to do and this helped with her symptoms.  Her VBG actually shows a PCO2 of 90, she is mostly compensated with a pH of 7.24.  Her lactate was normal.  Her CBC revealed a white count of 16.  I touched base with [place] pulmonology given her multiple bronchial stents placed and they stated that she would be okay for BiPAP if she tolerates that.  We then discussed intubation if the BiPAP would fail.   Patient is at this point full code.  We gave her a small dose of metoprolol probably around 1.5 mg and she started to become hypotensive so this was stopped.  Eventually she converted into normal sinus rhythm on her own.  Her pressure stayed in the low 100s.  At this point, we tried BiPAP.  Shortly after patient felt much improved with her respiratory rate coming down into the teens and having good tidal volume.  Her lungs sounded clear.  We did suction her again before we did the BiPAP and got clear sputum.  RT mention that there was not a great deal of sputum that was able to be obtained.  Patient at this point felt much improved on the BiPAP and actually fell asleep.  We did repeat her gas and her PCO2 is at 66.  At this point, I think she is likely stable to transfer back to [place] in [place].  I did touch base with the pulmonologist again as the chest x-ray did show a possible left and pneumonia.  She is currently on levofloxacin and Flagyl but he recommended holding off on further broadening of her coverage as she is afebrile here.  They will review the imaging there.  Patient transferred to [place] by critical care ambulance in stable and improved condition.    Critical Care time:  was 30 minutes for this patient excluding procedures.    Diagnosis:    ICD-10-CM    1. Acute respiratory failure with hypoxia and hypercarbia (H) J96.01 Lactic acid whole blood    J96.02 ISTAT gases lactate zay POCT     ISTAT gases lactate zay POCT     CANCELED: Lactic acid whole blood       Disposition:  Transferred to [facility]    Parmjit Nassar  3/31/2018   [facility] EMERGENCY DEPARTMENT  I, Parmjit Nassar, am serving as a scribe at 10:49 PM on 3/31/2018 to document services personally performed by Marli Allison MD based on my observations and the provider's statements to me.         Marli Allison MD  04/01/18 0245       Marli Allison MD  04/01/18 [id number]

## 2022-03-21 NOTE — ED NOTES
Discharged today from [place]. Hx Lung CA. Was feeling SOB at home, then became anxious, causing worse SOB. Pt's  states she goes into A-fib from time to time. Called EMS. Albuterol inhaler at home didn't help. EMS gave 2 mg versed. HR in the 160s upon arrival.   negative...